# Patient Record
Sex: MALE | Race: WHITE | Employment: FULL TIME | URBAN - METROPOLITAN AREA
[De-identification: names, ages, dates, MRNs, and addresses within clinical notes are randomized per-mention and may not be internally consistent; named-entity substitution may affect disease eponyms.]

---

## 2018-12-14 ENCOUNTER — HOSPITAL ENCOUNTER (EMERGENCY)
Age: 58
Discharge: HOME | End: 2018-12-14
Payer: COMMERCIAL

## 2018-12-14 VITALS
OXYGEN SATURATION: 97 % | BODY MASS INDEX: 23.62 KG/M2 | HEIGHT: 70 IN | HEART RATE: 64 BPM | RESPIRATION RATE: 18 BRPM | DIASTOLIC BLOOD PRESSURE: 82 MMHG | SYSTOLIC BLOOD PRESSURE: 111 MMHG | TEMPERATURE: 97.9 F | WEIGHT: 165 LBS

## 2018-12-14 DIAGNOSIS — S39.012A LOW BACK STRAIN, INITIAL ENCOUNTER: Primary | ICD-10-CM

## 2018-12-14 DIAGNOSIS — Z72.0 TOBACCO ABUSE: ICD-10-CM

## 2018-12-14 PROCEDURE — G8978 MOBILITY CURRENT STATUS: HCPCS

## 2018-12-14 PROCEDURE — G8979 MOBILITY GOAL STATUS: HCPCS

## 2018-12-14 PROCEDURE — 6370000000 HC RX 637 (ALT 250 FOR IP): Performed by: PHYSICIAN ASSISTANT

## 2018-12-14 PROCEDURE — G8980 MOBILITY D/C STATUS: HCPCS

## 2018-12-14 PROCEDURE — 96372 THER/PROPH/DIAG INJ SC/IM: CPT

## 2018-12-14 PROCEDURE — 99282 EMERGENCY DEPT VISIT SF MDM: CPT

## 2018-12-14 PROCEDURE — 97161 PT EVAL LOW COMPLEX 20 MIN: CPT

## 2018-12-14 PROCEDURE — 97110 THERAPEUTIC EXERCISES: CPT

## 2018-12-14 PROCEDURE — 97140 MANUAL THERAPY 1/> REGIONS: CPT

## 2018-12-14 PROCEDURE — 6360000002 HC RX W HCPCS: Performed by: PHYSICIAN ASSISTANT

## 2018-12-14 RX ORDER — LIDOCAINE 4 G/G
1 PATCH TOPICAL DAILY
Status: DISCONTINUED | OUTPATIENT
Start: 2018-12-14 | End: 2018-12-14 | Stop reason: HOSPADM

## 2018-12-14 RX ORDER — NAPROXEN 250 MG/1
250 TABLET ORAL 2 TIMES DAILY WITH MEALS
COMMUNITY
End: 2019-03-24 | Stop reason: ALTCHOICE

## 2018-12-14 RX ORDER — KETOROLAC TROMETHAMINE 30 MG/ML
60 INJECTION, SOLUTION INTRAMUSCULAR; INTRAVENOUS ONCE
Status: COMPLETED | OUTPATIENT
Start: 2018-12-14 | End: 2018-12-14

## 2018-12-14 RX ORDER — METHYLPREDNISOLONE SODIUM SUCCINATE 125 MG/2ML
125 INJECTION, POWDER, LYOPHILIZED, FOR SOLUTION INTRAMUSCULAR; INTRAVENOUS ONCE
Status: COMPLETED | OUTPATIENT
Start: 2018-12-14 | End: 2018-12-14

## 2018-12-14 RX ORDER — LIDOCAINE 50 MG/G
1 PATCH TOPICAL DAILY
Qty: 6 PATCH | Refills: 0 | Status: SHIPPED | OUTPATIENT
Start: 2018-12-14 | End: 2018-12-20

## 2018-12-14 RX ORDER — METHYLPREDNISOLONE 4 MG/1
TABLET ORAL
Qty: 1 KIT | Refills: 0 | Status: SHIPPED | OUTPATIENT
Start: 2018-12-14 | End: 2019-03-24 | Stop reason: ALTCHOICE

## 2018-12-14 RX ADMIN — METHYLPREDNISOLONE SODIUM SUCCINATE 125 MG: 125 INJECTION, POWDER, FOR SOLUTION INTRAMUSCULAR; INTRAVENOUS at 11:33

## 2018-12-14 RX ADMIN — KETOROLAC TROMETHAMINE 60 MG: 60 INJECTION, SOLUTION INTRAMUSCULAR at 11:33

## 2018-12-14 ASSESSMENT — PAIN DESCRIPTION - FREQUENCY: FREQUENCY: CONTINUOUS

## 2018-12-14 ASSESSMENT — PAIN DESCRIPTION - ORIENTATION: ORIENTATION: RIGHT

## 2018-12-14 ASSESSMENT — PAIN DESCRIPTION - PAIN TYPE: TYPE: ACUTE PAIN

## 2018-12-14 ASSESSMENT — PAIN SCALES - GENERAL
PAINLEVEL_OUTOF10: 1
PAINLEVEL_OUTOF10: 5

## 2018-12-14 ASSESSMENT — PAIN DESCRIPTION - ONSET: ONSET: ON-GOING

## 2018-12-14 ASSESSMENT — PAIN DESCRIPTION - LOCATION: LOCATION: BACK;HIP

## 2018-12-14 ASSESSMENT — PAIN DESCRIPTION - DESCRIPTORS: DESCRIPTORS: STABBING;SHARP

## 2018-12-14 ASSESSMENT — PAIN DESCRIPTION - PROGRESSION: CLINICAL_PROGRESSION: GRADUALLY WORSENING

## 2018-12-14 NOTE — PROGRESS NOTES
tenderness with palpation  [x]   Yes   []   No   []   NT  Location:  (+) [facility] R, R lumbar parapspinals  PT notes warmth  []   Yes   [x]   No   []   NT  Location:   PT notes increased muscle tone   []   Yes   [x]   No   []   NT  Location:   Ligament tenderness/provocation:   [x]   Yes   []   No   []   NT  Location:  R SIJ    Gait/Steps/Balance       Deviations on a level linoleum surface include dec oleg, dec stance RLE    [x]  All balance WFL unless otherwise noted below:  Static/ Dynamic Sitting:  Static/Dynamic Standing:    Quick Tests/Functional Myotome Tests:     Heel Walk (L4): [x]   Able to perform WNL       []   Unable to perform        Toe Walk (S1):  [x]   Able to perform WNL     []   Unable to perform    SI Special Tests     SI Special Test Findings   Standing Landmarks [x]  Iliac Crests Equal   [] R High  [] L High  []  PSIS Equal   []  R High  [x]  L High     Standing Flexion Test []   WFL     [x]   Positive R []   Positive L   Seated Landmarks [x]  Iliac Crests Equal   []  R High   [] L High  []  PSIS Equal   []  R High  [x]  L High     Seated Flexion Test []   WFL     [x]   Positive R []   Positive L   Sit up Test/Long sit test []   NEG     [x]   Positive - Comment below:  R short to long   Sacral Sulci Test [x]   [facility]     []   Positive R []   Positive L   SI distraction [x]   NEG      []   Positive R []   Positive L     SI compression [x]   NEG     []   Positive R []   Positive L   Sacral thrust tests [x]   NEG      []   Positive R []   Positive L   Prone knee bend test []   NEG     [x]   Positive     []   NT  Comments:     Lumbar Special Tests     Lumbar Special Test Findings   Straight Leg Raise [x]   NEG    []   Positive R []   Positive L   Crams []   NEG    []   Positive R []   Positive L   Dural Tension/Slump test []   NEG    []   Positive R []   Positive L   Distraction [x]   NEG    []   Inc pain []   Dec pain   Compression [x]   NEG    []   Inc pain []   Dec pain     Lumbar Range of

## 2018-12-14 NOTE — ED PROVIDER NOTES
development consistent with age. HEENT:  NC/NT. Airway patent. Neck:  Normal ROM. Supple. Respiratory:  Clear to auscultation and breath sounds equal.  CV:  Regular rate and rhythm, normal heart sounds, without pathological murmurs, ectopy, gallops, or rubs. GI:  Abdomen Soft, nontender, good bowel sounds. No firm or pulsatile mass. Back: lower lumbar spine right greater than left. Tenderness: None. Swelling: no.              Range of Motion: full range of motion. CVA Tenderness: No.            Straight leg raising:  Bilateral negative. Skin:  no erythema, rash or swelling noted. Distal Function:              Motor deficit: none. Sensory deficit: none. Pulse deficit: none. Calf Tenderness:  No Bilateral.               Edema:  none Both lower extremity(s). Reflexes: Bilateral knee,ankle,biceps normal.  Gait:  normal.  Integument:  Normal turgor. Warm, dry, without visible rash. Lymphatics: No lymphangitis or adenopathy noted. Neurological:  Oriented. Motor functions intact. Lab / Imaging Results   (All laboratory and radiology results have been personally reviewed by myself)  Labs:  No results found for this visit on 12/14/18. Imaging: All Radiology results interpreted by Radiologist unless otherwise noted. No orders to display       ED Course / Medical Decision Making     Medications   ketorolac (TORADOL) injection 60 mg (60 mg Intramuscular Given 12/14/18 1133)   methylPREDNISolone sodium (SOLU-MEDROL) injection 125 mg (125 mg Intramuscular Given 12/14/18 1133)        Medical Decision Making: afebrile, stable, pt. SpO2 on roomair is 97%, not hypoxic, no signs of compression fracture or cauda equina, PT evaluated pt, no indication for imaging, NSAIDs in ED along with steroids, follow up with PT and ortho in 2-3 days.     Films were not obtained based on negative suspicion for bony injury as per

## 2019-03-24 ENCOUNTER — APPOINTMENT (OUTPATIENT)
Dept: GENERAL RADIOLOGY | Age: 59
End: 2019-03-24
Payer: COMMERCIAL

## 2019-03-24 ENCOUNTER — HOSPITAL ENCOUNTER (EMERGENCY)
Age: 59
Discharge: HOME | End: 2019-03-24
Payer: COMMERCIAL

## 2019-03-24 VITALS
OXYGEN SATURATION: 97 % | BODY MASS INDEX: 26.48 KG/M2 | HEIGHT: 70 IN | WEIGHT: 185 LBS | TEMPERATURE: 98.1 F | SYSTOLIC BLOOD PRESSURE: 116 MMHG | RESPIRATION RATE: 16 BRPM | HEART RATE: 87 BPM | DIASTOLIC BLOOD PRESSURE: 80 MMHG

## 2019-03-24 DIAGNOSIS — S46.911A RIGHT SHOULDER STRAIN, INITIAL ENCOUNTER: Primary | ICD-10-CM

## 2019-03-24 DIAGNOSIS — S43.491A OTHER SPRAIN OF RIGHT SHOULDER JOINT, INITIAL ENCOUNTER: ICD-10-CM

## 2019-03-24 PROCEDURE — 99283 EMERGENCY DEPT VISIT LOW MDM: CPT

## 2019-03-24 PROCEDURE — 73030 X-RAY EXAM OF SHOULDER: CPT

## 2019-03-24 ASSESSMENT — ENCOUNTER SYMPTOMS
COLOR CHANGE: 0
SHORTNESS OF BREATH: 0

## 2019-03-24 ASSESSMENT — PAIN DESCRIPTION - ORIENTATION: ORIENTATION: RIGHT

## 2019-03-24 ASSESSMENT — PAIN DESCRIPTION - PAIN TYPE: TYPE: ACUTE PAIN

## 2019-03-24 ASSESSMENT — PAIN DESCRIPTION - LOCATION: LOCATION: SHOULDER

## 2019-03-24 ASSESSMENT — PAIN SCALES - GENERAL: PAINLEVEL_OUTOF10: 2

## 2021-06-07 ENCOUNTER — HOSPITAL ENCOUNTER (EMERGENCY)
Age: 61
Discharge: HOME | End: 2021-06-07
Attending: STUDENT IN AN ORGANIZED HEALTH CARE EDUCATION/TRAINING PROGRAM
Payer: COMMERCIAL

## 2021-06-07 ENCOUNTER — APPOINTMENT (OUTPATIENT)
Dept: GENERAL RADIOLOGY | Age: 61
End: 2021-06-07
Payer: COMMERCIAL

## 2021-06-07 VITALS
TEMPERATURE: 98.9 F | BODY MASS INDEX: 26.66 KG/M2 | WEIGHT: 180 LBS | OXYGEN SATURATION: 97 % | HEART RATE: 78 BPM | HEIGHT: 69 IN | SYSTOLIC BLOOD PRESSURE: 117 MMHG | DIASTOLIC BLOOD PRESSURE: 73 MMHG | RESPIRATION RATE: 16 BRPM

## 2021-06-07 DIAGNOSIS — S83.001A PATELLAR SUBLUXATION, RIGHT, INITIAL ENCOUNTER: Primary | ICD-10-CM

## 2021-06-07 PROCEDURE — 99282 EMERGENCY DEPT VISIT SF MDM: CPT

## 2021-06-07 PROCEDURE — 73560 X-RAY EXAM OF KNEE 1 OR 2: CPT

## 2021-06-07 ASSESSMENT — PAIN SCALES - GENERAL: PAINLEVEL_OUTOF10: 1

## 2021-06-07 NOTE — ED NOTES
Discharge instructions reviewed with Pt. Pt verbalizes understanding at this time. Prescriptions/medications reviewed with pt at this time. Pt condition stable at this time. No concerns voiced.       Chris Clemente RN  06/07/21 0408

## 2021-06-07 NOTE — ED PROVIDER NOTES
[address] [facility],[address]      CHIEF COMPLAINT  Knee Pain (Pt reports today struck his right knee @ approx 225 into a table. Pt reports he felt like his knee cap shifted with the injury. Pt reports able to still walk on it but has continued discomfort. )     HISTORY OF PRESENT ILLNESS  Jn Perez is a 62 y.o. male  who presents to the ED complaining of knee pain. Patient states that he was at work this evening when he struck his right knee into a table. He feels like his kneecap shifted to the side but then returned back to its normal position. He denies any other complaints or concerns. He states that he has been able to ambulate on it with some small amount of pain since that occurred. He denies any numbness or tingling. He denies other complaints or concerns. No other complaints, modifying factors or associated symptoms. I have reviewed the following from the nursing documentation. Past Medical History:   Diagnosis Date    Reflux      Past Surgical History:   Procedure Laterality Date    APPENDECTOMY      HERNIA REPAIR Right 12/16/14    Right Inguinal Hernia Repair    TONSILLECTOMY       History reviewed. No pertinent family history.   Social History     Socioeconomic History    Marital status: Single     Spouse name: Not on file    Number of children: Not on file    Years of education: Not on file    Highest education level: Not on file   Occupational History    Not on file   Tobacco Use    Smoking status: Current Every Day Smoker     Packs/day: 1.00     Years: 40.00     Pack years: 40.00    Smokeless tobacco: Never Used   Vaping Use    Vaping Use: Former   Substance and Sexual Activity    Alcohol use: No    Drug use: No    Sexual activity: Not on file   Other Topics Concern    Not on file   Social History Narrative    Not on file     Social Determinants of Health     Financial Resource Strain:     Difficulty of Paying Living Expenses:    Food Insecurity:     Worried About Running Out of Food in the Last Year:     Wilbert of Food in the Last Year:    Transportation Needs:     Lack of Transportation (Medical):  Lack of Transportation (Non-Medical):    Physical Activity:     Days of Exercise per Week:     Minutes of Exercise per Session:    Stress:     Feeling of Stress :    Social Connections:     Frequency of Communication with Friends and Family:     Frequency of Social Gatherings with Friends and Family:     Attends Yarsanism Services:     Active Member of Clubs or Organizations:     Attends Club or Organization Meetings:     Marital Status:    Intimate Partner Violence:     Fear of Current or Ex-Partner:     Emotionally Abused:     Physically Abused:     Sexually Abused:      No current facility-administered medications for this encounter. Current Outpatient Medications   Medication Sig Dispense Refill    omeprazole (PRILOSEC) 20 MG capsule Take 20 mg by mouth daily. No Known Allergies    REVIEW OF SYSTEMS  10 systems reviewed, pertinent positives per HPI otherwise noted to be negative. PHYSICAL EXAM  /73   Pulse 78   Temp 98.9 °F (37.2 °C) (Oral)   Resp 16   Ht 5' 9\" (1.753 m)   Wt 180 lb (81.6 kg)   SpO2 97%   BMI 26.58 kg/m²    GENERAL APPEARANCE: Awake and alert. Cooperative. No acute distress. HENT: Normocephalic. Atraumatic. NECK: Supple. EYES: PERRL. EOM's grossly intact. HEART/CHEST: RRR. No murmurs. LUNGS: Respirations unlabored. CTAB. Good air exchange. Speaking comfortably in full sentences. ABDOMEN: No tenderness. Soft. Non-distended. No masses. No organomegaly. No guarding or rebound. MUSCULOSKELETAL: No extremity edema. Compartments soft. No deformity. Mild tenderness to palpation over the medial and lateral aspects of the right knee, no joint laxity. All extremities neurovascularly intact. SKIN: Warm and dry. No acute rashes. NEUROLOGICAL: Alert and oriented. CN's 2-12 intact.  No gross facial drooping. Strength 5/5, sensation intact. .  Gait normal.  PSYCHIATRIC: Normal mood and affect. LABS  I have reviewed all labs for this visit. No results found for this visit on 06/07/21. RADIOLOGY  XR KNEE RIGHT (1-2 VIEWS)   Final Result   Unremarkable right knee. ED COURSE/MDM  Patient seen and evaluated. Old records reviewed. Labs and imaging reviewed and results discussed with patient. Patient is a 81-year-old male, presenting with concerns for right knee pain after striking it on a table at work, states that his kneecap shifted to the side but returned to position. Full HPI as detailed above. Upon arrival in the ED, vitals reassuring. Patient is resting comfortably is in no acute distress. Physical exam as detailed above, with mild tenderness to palpation over the medial and lateral aspects of the right knee but no obvious swelling. No joint laxity or obvious joint effusion. Patella is in proper alignment. Right lower extremity is neurovascularly intact with soft compartments. Patient able to ambulate without difficulty. X-ray of the right knee was performed and was negative for any acute findings. Symptoms likely secondary to knee sprain versus patellar subluxation given the patient's history. My concern for vascular compromise given the mechanism and current findings. Patient will be discharged, advised to take over-the-counter Tylenol/ibuprofen as needed for pain control. Also counseled on RICE therapy. Patient will be discharged. Given return precautions to the ED and advised to follow-up with PCP and orthopedics as needed. During the patient's ED course, the patient was given:  Medications - No data to display     CLINICAL IMPRESSION  1. Patellar subluxation, right, initial encounter        Blood pressure 117/73, pulse 78, temperature 98.9 °F (37.2 °C), temperature source Oral, resp.  rate 16, height 5' 9\" (1.753 m), weight 180 lb (81.6 kg), SpO2 97 %. DISPOSITION  Renard Verduzco was discharged to home in good condition. Patient was given scripts for the following medications. I counseled patient how to take these medications. Discharge Medication List as of 6/7/2021  7:01 PM          Follow-up with:  Rafal Noriega  [phone]  Schedule an appointment as soon as possible for a visit   To establish care, as needed    [id number] [facility] Emergency Department  [place] [address] [address]  [phone]  Go to   If symptoms worsen    Martir Varma MD  [address] [place] [address]  [phone]    Schedule an appointment as soon as possible for a visit   As needed, if symptoms do not improve      DISCLAIMER: This chart was created using Dragon dictation software. Efforts were made by me to ensure accuracy, however some errors may be present due to limitations of this technology and occasionally words are not transcribed correctly.        Maria Alejandra Rangel MD  06/08/21 9837

## 2021-12-23 ENCOUNTER — HOSPITAL ENCOUNTER (INPATIENT)
Age: 61
LOS: 1 days | Discharge: HOME | DRG: 246 | End: 2021-12-24
Attending: EMERGENCY MEDICINE | Admitting: INTERNAL MEDICINE
Payer: COMMERCIAL

## 2021-12-23 ENCOUNTER — APPOINTMENT (OUTPATIENT)
Dept: GENERAL RADIOLOGY | Age: 61
DRG: 246 | End: 2021-12-23

## 2021-12-23 DIAGNOSIS — I21.4 NSTEMI (NON-ST ELEVATED MYOCARDIAL INFARCTION) (HCC): Primary | ICD-10-CM

## 2021-12-23 DIAGNOSIS — D64.9 ANEMIA, UNSPECIFIED TYPE: ICD-10-CM

## 2021-12-23 LAB
A/G RATIO: 1.4 (ref 1.1–2.2)
ALBUMIN SERPL-MCNC: 4.2 G/DL (ref 3.4–5)
ALP BLD-CCNC: 58 U/L (ref 40–129)
ALT SERPL-CCNC: 21 U/L (ref 10–40)
ANION GAP SERPL CALCULATED.3IONS-SCNC: 12 MMOL/L (ref 3–16)
ANTI-XA UNFRAC HEPARIN: 0.12 IU/ML (ref 0.3–0.7)
APTT: 20 SEC (ref 26.2–38.6)
AST SERPL-CCNC: 28 U/L (ref 15–37)
BASOPHILS ABSOLUTE: 0 K/UL (ref 0–0.2)
BASOPHILS RELATIVE PERCENT: 0.6 %
BILIRUB SERPL-MCNC: <0.2 MG/DL (ref 0–1)
BUN BLDV-MCNC: 19 MG/DL (ref 7–20)
CALCIUM SERPL-MCNC: 8.9 MG/DL (ref 8.3–10.6)
CHLORIDE BLD-SCNC: 103 MMOL/L (ref 99–110)
CO2: 21 MMOL/L (ref 21–32)
CREAT SERPL-MCNC: 0.9 MG/DL (ref 0.8–1.3)
EKG ATRIAL RATE: 73 BPM
EKG DIAGNOSIS: NORMAL
EKG P AXIS: 70 DEGREES
EKG P-R INTERVAL: 140 MS
EKG Q-T INTERVAL: 432 MS
EKG QRS DURATION: 78 MS
EKG QTC CALCULATION (BAZETT): 475 MS
EKG R AXIS: 36 DEGREES
EKG T AXIS: 34 DEGREES
EKG VENTRICULAR RATE: 73 BPM
EOSINOPHILS ABSOLUTE: 0.2 K/UL (ref 0–0.6)
EOSINOPHILS RELATIVE PERCENT: 2.2 %
GFR AFRICAN AMERICAN: >60
GFR NON-AFRICAN AMERICAN: >60
GLUCOSE BLD-MCNC: 100 MG/DL (ref 70–99)
HCT VFR BLD CALC: 29.8 % (ref 40.5–52.5)
HEMOGLOBIN: 9.5 G/DL (ref 13.5–17.5)
LV EF: 55 %
LV EF: 60 %
LVEF MODALITY: NORMAL
LVEF MODALITY: NORMAL
LYMPHOCYTES ABSOLUTE: 1.9 K/UL (ref 1–5.1)
LYMPHOCYTES RELATIVE PERCENT: 27.3 %
MCH RBC QN AUTO: 24.1 PG (ref 26–34)
MCHC RBC AUTO-ENTMCNC: 31.9 G/DL (ref 31–36)
MCV RBC AUTO: 75.6 FL (ref 80–100)
MONOCYTES ABSOLUTE: 0.6 K/UL (ref 0–1.3)
MONOCYTES RELATIVE PERCENT: 8.3 %
NEUTROPHILS ABSOLUTE: 4.3 K/UL (ref 1.7–7.7)
NEUTROPHILS RELATIVE PERCENT: 61.6 %
PDW BLD-RTO: 17.2 % (ref 12.4–15.4)
PLATELET # BLD: 282 K/UL (ref 135–450)
PMV BLD AUTO: 9.4 FL (ref 5–10.5)
POTASSIUM SERPL-SCNC: 4.3 MMOL/L (ref 3.5–5.1)
RBC # BLD: 3.93 M/UL (ref 4.2–5.9)
SARS-COV-2, NAAT: NOT DETECTED
SODIUM BLD-SCNC: 136 MMOL/L (ref 136–145)
TOTAL PROTEIN: 7.2 G/DL (ref 6.4–8.2)
TROPONIN: 0.06 NG/ML
WBC # BLD: 7 K/UL (ref 4–11)

## 2021-12-23 PROCEDURE — 92933 PRQ TRLML C ATHRC ST ANGIOP1: CPT | Performed by: INTERNAL MEDICINE

## 2021-12-23 PROCEDURE — 99285 EMERGENCY DEPT VISIT HI MDM: CPT

## 2021-12-23 PROCEDURE — 6370000000 HC RX 637 (ALT 250 FOR IP)

## 2021-12-23 PROCEDURE — 99255 IP/OBS CONSLTJ NEW/EST HI 80: CPT | Performed by: INTERNAL MEDICINE

## 2021-12-23 PROCEDURE — 92929 PR PRQ TRLUML CORONARY STENT W/ANGIO ADDL ART/BRNCH: CPT | Performed by: INTERNAL MEDICINE

## 2021-12-23 PROCEDURE — C1724 CATH, TRANS ATHEREC,ROTATION: HCPCS

## 2021-12-23 PROCEDURE — 6360000002 HC RX W HCPCS: Performed by: INTERNAL MEDICINE

## 2021-12-23 PROCEDURE — 85025 COMPLETE CBC W/AUTO DIFF WBC: CPT

## 2021-12-23 PROCEDURE — 2580000003 HC RX 258

## 2021-12-23 PROCEDURE — C1874 STENT, COATED/COV W/DEL SYS: HCPCS

## 2021-12-23 PROCEDURE — 93005 ELECTROCARDIOGRAM TRACING: CPT | Performed by: EMERGENCY MEDICINE

## 2021-12-23 PROCEDURE — 92933 PRQ TRLML C ATHRC ST ANGIOP1: CPT

## 2021-12-23 PROCEDURE — 84484 ASSAY OF TROPONIN QUANT: CPT

## 2021-12-23 PROCEDURE — 85520 HEPARIN ASSAY: CPT

## 2021-12-23 PROCEDURE — 92978 ENDOLUMINL IVUS OCT C 1ST: CPT

## 2021-12-23 PROCEDURE — 94761 N-INVAS EAR/PLS OXIMETRY MLT: CPT

## 2021-12-23 PROCEDURE — 2580000003 HC RX 258: Performed by: INTERNAL MEDICINE

## 2021-12-23 PROCEDURE — C1725 CATH, TRANSLUMIN NON-LASER: HCPCS

## 2021-12-23 PROCEDURE — 99152 MOD SED SAME PHYS/QHP 5/>YRS: CPT | Performed by: INTERNAL MEDICINE

## 2021-12-23 PROCEDURE — 85730 THROMBOPLASTIN TIME PARTIAL: CPT

## 2021-12-23 PROCEDURE — 02F03ZZ FRAGMENTATION IN CORONARY ARTERY, ONE ARTERY, PERCUTANEOUS APPROACH: ICD-10-PCS | Performed by: INTERNAL MEDICINE

## 2021-12-23 PROCEDURE — 4A023N7 MEASUREMENT OF CARDIAC SAMPLING AND PRESSURE, LEFT HEART, PERCUTANEOUS APPROACH: ICD-10-PCS | Performed by: INTERNAL MEDICINE

## 2021-12-23 PROCEDURE — 92978 ENDOLUMINL IVUS OCT C 1ST: CPT | Performed by: INTERNAL MEDICINE

## 2021-12-23 PROCEDURE — 6370000000 HC RX 637 (ALT 250 FOR IP): Performed by: INTERNAL MEDICINE

## 2021-12-23 PROCEDURE — C1761 HC CATH TRANSLUM INTRAVASCULAR LITHOTRIPSY CORONARY: HCPCS

## 2021-12-23 PROCEDURE — 85347 COAGULATION TIME ACTIVATED: CPT

## 2021-12-23 PROCEDURE — 71046 X-RAY EXAM CHEST 2 VIEWS: CPT

## 2021-12-23 PROCEDURE — 6360000002 HC RX W HCPCS

## 2021-12-23 PROCEDURE — C1769 GUIDE WIRE: HCPCS

## 2021-12-23 PROCEDURE — 2060000000 HC ICU INTERMEDIATE R&B

## 2021-12-23 PROCEDURE — 2500000003 HC RX 250 WO HCPCS

## 2021-12-23 PROCEDURE — B240ZZ3 ULTRASONOGRAPHY OF SINGLE CORONARY ARTERY, INTRAVASCULAR: ICD-10-PCS | Performed by: INTERNAL MEDICINE

## 2021-12-23 PROCEDURE — C1894 INTRO/SHEATH, NON-LASER: HCPCS

## 2021-12-23 PROCEDURE — 02C03Z7 EXTIRPATION OF MATTER FROM CORONARY ARTERY, ONE ARTERY, ORBITAL ATHERECTOMY TECHNIQUE, PERCUTANEOUS APPROACH: ICD-10-PCS | Performed by: INTERNAL MEDICINE

## 2021-12-23 PROCEDURE — C1887 CATHETER, GUIDING: HCPCS

## 2021-12-23 PROCEDURE — 93458 L HRT ARTERY/VENTRICLE ANGIO: CPT | Performed by: INTERNAL MEDICINE

## 2021-12-23 PROCEDURE — 87635 SARS-COV-2 COVID-19 AMP PRB: CPT

## 2021-12-23 PROCEDURE — B2111ZZ FLUOROSCOPY OF MULTIPLE CORONARY ARTERIES USING LOW OSMOLAR CONTRAST: ICD-10-PCS | Performed by: INTERNAL MEDICINE

## 2021-12-23 PROCEDURE — 2709999900 HC NON-CHARGEABLE SUPPLY

## 2021-12-23 PROCEDURE — 36415 COLL VENOUS BLD VENIPUNCTURE: CPT

## 2021-12-23 PROCEDURE — 93306 TTE W/DOPPLER COMPLETE: CPT

## 2021-12-23 PROCEDURE — 93005 ELECTROCARDIOGRAM TRACING: CPT | Performed by: INTERNAL MEDICINE

## 2021-12-23 PROCEDURE — C1753 CATH, INTRAVAS ULTRASOUND: HCPCS

## 2021-12-23 PROCEDURE — 6360000004 HC RX CONTRAST MEDICATION

## 2021-12-23 PROCEDURE — 93458 L HRT ARTERY/VENTRICLE ANGIO: CPT

## 2021-12-23 PROCEDURE — 80053 COMPREHEN METABOLIC PANEL: CPT

## 2021-12-23 PROCEDURE — 93010 ELECTROCARDIOGRAM REPORT: CPT | Performed by: INTERNAL MEDICINE

## 2021-12-23 PROCEDURE — 2700000000 HC OXYGEN THERAPY PER DAY

## 2021-12-23 PROCEDURE — B2151ZZ FLUOROSCOPY OF LEFT HEART USING LOW OSMOLAR CONTRAST: ICD-10-PCS | Performed by: INTERNAL MEDICINE

## 2021-12-23 PROCEDURE — 027135Z DILATION OF CORONARY ARTERY, TWO ARTERIES WITH TWO DRUG-ELUTING INTRALUMINAL DEVICES, PERCUTANEOUS APPROACH: ICD-10-PCS | Performed by: INTERNAL MEDICINE

## 2021-12-23 PROCEDURE — 92929 HC PRQ CARD STENT W/ANGIO ADDL: CPT

## 2021-12-23 RX ORDER — SODIUM CHLORIDE 0.9 % (FLUSH) 0.9 %
5-40 SYRINGE (ML) INJECTION PRN
Status: DISCONTINUED | OUTPATIENT
Start: 2021-12-23 | End: 2021-12-24 | Stop reason: HOSPADM

## 2021-12-23 RX ORDER — SODIUM CHLORIDE 0.9 % (FLUSH) 0.9 %
5-40 SYRINGE (ML) INJECTION EVERY 12 HOURS SCHEDULED
Status: DISCONTINUED | OUTPATIENT
Start: 2021-12-23 | End: 2021-12-23 | Stop reason: SDUPTHER

## 2021-12-23 RX ORDER — PANTOPRAZOLE SODIUM 40 MG/1
40 TABLET, DELAYED RELEASE ORAL
Status: DISCONTINUED | OUTPATIENT
Start: 2021-12-24 | End: 2021-12-24 | Stop reason: HOSPADM

## 2021-12-23 RX ORDER — SODIUM CHLORIDE 0.9 % (FLUSH) 0.9 %
5-40 SYRINGE (ML) INJECTION PRN
Status: DISCONTINUED | OUTPATIENT
Start: 2021-12-23 | End: 2021-12-23 | Stop reason: SDUPTHER

## 2021-12-23 RX ORDER — MIDAZOLAM HYDROCHLORIDE 5 MG/ML
INJECTION INTRAMUSCULAR; INTRAVENOUS
Status: COMPLETED | OUTPATIENT
Start: 2021-12-23 | End: 2021-12-23

## 2021-12-23 RX ORDER — FENTANYL CITRATE 50 UG/ML
INJECTION, SOLUTION INTRAMUSCULAR; INTRAVENOUS
Status: COMPLETED | OUTPATIENT
Start: 2021-12-23 | End: 2021-12-23

## 2021-12-23 RX ORDER — POLYETHYLENE GLYCOL 3350 17 G/17G
17 POWDER, FOR SOLUTION ORAL DAILY PRN
Status: DISCONTINUED | OUTPATIENT
Start: 2021-12-23 | End: 2021-12-24 | Stop reason: HOSPADM

## 2021-12-23 RX ORDER — HEPARIN SODIUM 10000 [USP'U]/100ML
1000 INJECTION, SOLUTION INTRAVENOUS CONTINUOUS
Status: DISCONTINUED | OUTPATIENT
Start: 2021-12-23 | End: 2021-12-23 | Stop reason: SDUPTHER

## 2021-12-23 RX ORDER — EPTIFIBATIDE 0.75 MG/ML
2 INJECTION, SOLUTION INTRAVENOUS CONTINUOUS
Status: ACTIVE | OUTPATIENT
Start: 2021-12-23 | End: 2021-12-24

## 2021-12-23 RX ORDER — ONDANSETRON 2 MG/ML
4 INJECTION INTRAMUSCULAR; INTRAVENOUS EVERY 6 HOURS PRN
Status: DISCONTINUED | OUTPATIENT
Start: 2021-12-23 | End: 2021-12-24 | Stop reason: HOSPADM

## 2021-12-23 RX ORDER — HEPARIN SODIUM 1000 [USP'U]/ML
4000 INJECTION, SOLUTION INTRAVENOUS; SUBCUTANEOUS PRN
Status: DISCONTINUED | OUTPATIENT
Start: 2021-12-23 | End: 2021-12-24

## 2021-12-23 RX ORDER — ASPIRIN 81 MG/1
81 TABLET, CHEWABLE ORAL DAILY
Status: DISCONTINUED | OUTPATIENT
Start: 2021-12-24 | End: 2021-12-24 | Stop reason: HOSPADM

## 2021-12-23 RX ORDER — SODIUM CHLORIDE 9 MG/ML
25 INJECTION, SOLUTION INTRAVENOUS PRN
Status: DISCONTINUED | OUTPATIENT
Start: 2021-12-23 | End: 2021-12-24 | Stop reason: HOSPADM

## 2021-12-23 RX ORDER — HEPARIN SODIUM 1000 [USP'U]/ML
2000 INJECTION, SOLUTION INTRAVENOUS; SUBCUTANEOUS ONCE
Status: DISCONTINUED | OUTPATIENT
Start: 2021-12-23 | End: 2021-12-23 | Stop reason: DRUGHIGH

## 2021-12-23 RX ORDER — HEPARIN SODIUM 1000 [USP'U]/ML
4000 INJECTION, SOLUTION INTRAVENOUS; SUBCUTANEOUS ONCE
Status: COMPLETED | OUTPATIENT
Start: 2021-12-23 | End: 2021-12-23

## 2021-12-23 RX ORDER — HEPARIN SODIUM 1000 [USP'U]/ML
INJECTION, SOLUTION INTRAVENOUS; SUBCUTANEOUS
Status: COMPLETED | OUTPATIENT
Start: 2021-12-23 | End: 2021-12-23

## 2021-12-23 RX ORDER — EPTIFIBATIDE 2 MG/ML
INJECTION, SOLUTION INTRAVENOUS
Status: COMPLETED | OUTPATIENT
Start: 2021-12-23 | End: 2021-12-23

## 2021-12-23 RX ORDER — NICOTINE 21 MG/24HR
1 PATCH, TRANSDERMAL 24 HOURS TRANSDERMAL DAILY
Status: DISCONTINUED | OUTPATIENT
Start: 2021-12-23 | End: 2021-12-24 | Stop reason: HOSPADM

## 2021-12-23 RX ORDER — SODIUM CHLORIDE 9 MG/ML
INJECTION, SOLUTION INTRAVENOUS CONTINUOUS
Status: DISCONTINUED | OUTPATIENT
Start: 2021-12-23 | End: 2021-12-24 | Stop reason: HOSPADM

## 2021-12-23 RX ORDER — ACETAMINOPHEN 650 MG/1
650 SUPPOSITORY RECTAL EVERY 6 HOURS PRN
Status: DISCONTINUED | OUTPATIENT
Start: 2021-12-23 | End: 2021-12-24 | Stop reason: HOSPADM

## 2021-12-23 RX ORDER — HEPARIN SODIUM 1000 [USP'U]/ML
2000 INJECTION, SOLUTION INTRAVENOUS; SUBCUTANEOUS PRN
Status: DISCONTINUED | OUTPATIENT
Start: 2021-12-23 | End: 2021-12-24

## 2021-12-23 RX ORDER — HEPARIN SODIUM 10000 [USP'U]/100ML
1170 INJECTION, SOLUTION INTRAVENOUS CONTINUOUS
Status: DISCONTINUED | OUTPATIENT
Start: 2021-12-23 | End: 2021-12-24

## 2021-12-23 RX ORDER — ONDANSETRON 4 MG/1
4 TABLET, ORALLY DISINTEGRATING ORAL EVERY 8 HOURS PRN
Status: DISCONTINUED | OUTPATIENT
Start: 2021-12-23 | End: 2021-12-24 | Stop reason: HOSPADM

## 2021-12-23 RX ORDER — ACETAMINOPHEN 325 MG/1
650 TABLET ORAL EVERY 4 HOURS PRN
Status: DISCONTINUED | OUTPATIENT
Start: 2021-12-23 | End: 2021-12-23 | Stop reason: SDUPTHER

## 2021-12-23 RX ORDER — ACETAMINOPHEN 325 MG/1
650 TABLET ORAL EVERY 6 HOURS PRN
Status: DISCONTINUED | OUTPATIENT
Start: 2021-12-23 | End: 2021-12-24 | Stop reason: HOSPADM

## 2021-12-23 RX ORDER — ATORVASTATIN CALCIUM 80 MG/1
80 TABLET, FILM COATED ORAL NIGHTLY
Status: DISCONTINUED | OUTPATIENT
Start: 2021-12-23 | End: 2021-12-24 | Stop reason: HOSPADM

## 2021-12-23 RX ORDER — DIPHENHYDRAMINE HYDROCHLORIDE 50 MG/ML
INJECTION INTRAMUSCULAR; INTRAVENOUS
Status: COMPLETED | OUTPATIENT
Start: 2021-12-23 | End: 2021-12-23

## 2021-12-23 RX ORDER — SODIUM CHLORIDE 0.9 % (FLUSH) 0.9 %
5-40 SYRINGE (ML) INJECTION EVERY 12 HOURS SCHEDULED
Status: DISCONTINUED | OUTPATIENT
Start: 2021-12-23 | End: 2021-12-24 | Stop reason: HOSPADM

## 2021-12-23 RX ADMIN — MIDAZOLAM HYDROCHLORIDE 1 MG: 5 INJECTION INTRAMUSCULAR; INTRAVENOUS at 16:32

## 2021-12-23 RX ADMIN — HEPARIN SODIUM 4000 UNITS: 1000 INJECTION INTRAVENOUS; SUBCUTANEOUS at 14:18

## 2021-12-23 RX ADMIN — HEPARIN SODIUM 3000 UNITS: 1000 INJECTION, SOLUTION INTRAVENOUS; SUBCUTANEOUS at 16:53

## 2021-12-23 RX ADMIN — ATORVASTATIN CALCIUM 80 MG: 80 TABLET, FILM COATED ORAL at 21:58

## 2021-12-23 RX ADMIN — EPTIFIBATIDE 2 MCG/KG/MIN: 0.75 INJECTION INTRAVENOUS at 16:51

## 2021-12-23 RX ADMIN — EPTIFIBATIDE 2 MCG/KG/MIN: 0.75 INJECTION INTRAVENOUS at 23:11

## 2021-12-23 RX ADMIN — MIDAZOLAM HYDROCHLORIDE 1 MG: 5 INJECTION INTRAMUSCULAR; INTRAVENOUS at 16:29

## 2021-12-23 RX ADMIN — FENTANYL CITRATE 50 MCG: 50 INJECTION INTRAMUSCULAR; INTRAVENOUS at 15:02

## 2021-12-23 RX ADMIN — FENTANYL CITRATE 25 MCG: 50 INJECTION, SOLUTION INTRAMUSCULAR; INTRAVENOUS at 16:29

## 2021-12-23 RX ADMIN — MIDAZOLAM HYDROCHLORIDE 1 MG: 5 INJECTION INTRAMUSCULAR; INTRAVENOUS at 15:41

## 2021-12-23 RX ADMIN — MIDAZOLAM HYDROCHLORIDE 1 MG: 5 INJECTION INTRAMUSCULAR; INTRAVENOUS at 15:10

## 2021-12-23 RX ADMIN — MIDAZOLAM HYDROCHLORIDE 1 MG: 5 INJECTION, SOLUTION INTRAMUSCULAR; INTRAVENOUS at 15:05

## 2021-12-23 RX ADMIN — HEPARIN SODIUM 1000 UNITS/HR: 5000 INJECTION INTRAVENOUS; SUBCUTANEOUS at 14:23

## 2021-12-23 RX ADMIN — MIDAZOLAM HYDROCHLORIDE 1 MG: 5 INJECTION INTRAMUSCULAR; INTRAVENOUS at 15:52

## 2021-12-23 RX ADMIN — FENTANYL CITRATE 25 MCG: 50 INJECTION, SOLUTION INTRAMUSCULAR; INTRAVENOUS at 16:32

## 2021-12-23 RX ADMIN — MIDAZOLAM HYDROCHLORIDE 1 MG: 5 INJECTION, SOLUTION INTRAMUSCULAR; INTRAVENOUS at 15:02

## 2021-12-23 RX ADMIN — MIDAZOLAM HYDROCHLORIDE 1 MG: 5 INJECTION INTRAMUSCULAR; INTRAVENOUS at 16:35

## 2021-12-23 RX ADMIN — EPTIFIBATIDE 14.6 MG: 2 INJECTION, SOLUTION INTRAVENOUS at 15:54

## 2021-12-23 RX ADMIN — MIDAZOLAM HYDROCHLORIDE 1 MG: 5 INJECTION INTRAMUSCULAR; INTRAVENOUS at 16:14

## 2021-12-23 RX ADMIN — FENTANYL CITRATE 25 MCG: 50 INJECTION, SOLUTION INTRAMUSCULAR; INTRAVENOUS at 16:14

## 2021-12-23 RX ADMIN — FENTANYL CITRATE 25 MCG: 50 INJECTION, SOLUTION INTRAMUSCULAR; INTRAVENOUS at 16:59

## 2021-12-23 RX ADMIN — HEPARIN SODIUM 2000 UNITS: 1000 INJECTION, SOLUTION INTRAVENOUS; SUBCUTANEOUS at 15:53

## 2021-12-23 RX ADMIN — DIPHENHYDRAMINE HYDROCHLORIDE 25 MG: 50 INJECTION INTRAMUSCULAR; INTRAVENOUS at 16:59

## 2021-12-23 RX ADMIN — FENTANYL CITRATE 25 MCG: 50 INJECTION, SOLUTION INTRAMUSCULAR; INTRAVENOUS at 15:52

## 2021-12-23 RX ADMIN — MIDAZOLAM HYDROCHLORIDE 1 MG: 5 INJECTION INTRAMUSCULAR; INTRAVENOUS at 15:22

## 2021-12-23 RX ADMIN — MIDAZOLAM HYDROCHLORIDE 1 MG: 5 INJECTION INTRAMUSCULAR; INTRAVENOUS at 15:32

## 2021-12-23 RX ADMIN — MIDAZOLAM HYDROCHLORIDE 1 MG: 5 INJECTION INTRAMUSCULAR; INTRAVENOUS at 16:59

## 2021-12-23 RX ADMIN — EPTIFIBATIDE 612.47 MCG: 2 INJECTION, SOLUTION INTRAVENOUS at 15:11

## 2021-12-23 RX ADMIN — FENTANYL CITRATE 25 MCG: 50 INJECTION, SOLUTION INTRAMUSCULAR; INTRAVENOUS at 16:35

## 2021-12-23 RX ADMIN — FENTANYL CITRATE 25 MCG: 50 INJECTION, SOLUTION INTRAMUSCULAR; INTRAVENOUS at 15:22

## 2021-12-23 RX ADMIN — FENTANYL CITRATE 25 MCG: 50 INJECTION, SOLUTION INTRAMUSCULAR; INTRAVENOUS at 15:10

## 2021-12-23 RX ADMIN — FENTANYL CITRATE 25 MCG: 50 INJECTION, SOLUTION INTRAMUSCULAR; INTRAVENOUS at 15:56

## 2021-12-23 RX ADMIN — FENTANYL CITRATE 25 MCG: 50 INJECTION, SOLUTION INTRAMUSCULAR; INTRAVENOUS at 15:32

## 2021-12-23 NOTE — PROGRESS NOTES
Patient belongings collected by Kiki Parker RN from Rm 201 to Cath lab. Patient being transported to .

## 2021-12-23 NOTE — ED PROVIDER NOTES
EMERGENCY DEPARTMENT ENCOUNTER      This patient was seen and evaluated by the attending physician. Pt Name: Miranda Stearns  MRN: [id number]  Cu[place] 1960  Date of evaluation: 12/23/2021  Provider: ALLEN SenC  PCP: No primary care provider on file. ED Attending: Dr. Axel Monson    History provided by the patient. CHIEF COMPLAINT:     Chief Complaint   Patient presents with    Chest Pain     Intermittent starting 0830 this morning. None on arrival.       HISTORY OF PRESENT ILLNESS:      Miranda Stearns is a 62 y.o. male who presents to [address] [place] [address] ER with complaints of chest pain. Patient states that he had intermittent chest pain started about 830 this morning, states that is actually had a last couple days, states that he gets diaphoretic, pain radiates into his jaw and down his left arm, get short of breath with it as well. No pain currently. Has not had any type of cardiac evaluation, history of smoking, family history and a history of hypertension. Is here for further evaluation. Location:-  Quality:-  Severity:-  Duration:-  Modifying factors:-    Nursing Notes were reviewed     REVIEW OF SYSTEMS:     Review of Systems  All systems, atotal of 10, are reviewed and negative except for those that were just noted in history present illness. PAST MEDICAL HISTORY:     Past Medical History:   Diagnosis Date    Reflux          SURGICAL HISTORY:      Past Surgical History:   Procedure Laterality Date    APPENDECTOMY      HERNIA REPAIR Right 12/16/14    Right Inguinal Hernia Repair    TONSILLECTOMY           CURRENT MEDICATIONS:       Current Discharge Medication List      CONTINUE these medications which have NOT CHANGED    Details   omeprazole (PRILOSEC) 20 MG capsule Take 20 mg by mouth daily. ALLERGIES:    Patient has no known allergies. FAMILY HISTORY:     No family history on file.        SOCIAL HISTORY:       Social History     Socioeconomic History  Marital status: Single     Spouse name: Not on file    Number of children: Not on file    Years of education: Not on file    Highest education level: Not on file   Occupational History    Not on file   Tobacco Use    Smoking status: Current Every Day Smoker     Packs/day: 1.00     Years: 40.00     Pack years: 40.00    Smokeless tobacco: Never Used   Vaping Use    Vaping Use: Former   Substance and Sexual Activity    Alcohol use: No    Drug use: No    Sexual activity: Not on file   Other Topics Concern    Not on file   Social History Narrative    Not on file     Social Determinants of Health     Financial Resource Strain:     Difficulty of Paying Living Expenses: Not on file   Food Insecurity:     Worried About [address] in the Last Year: Not on file    Wilbert of Food in the Last Year: Not on file   Transportation Needs:     Lack of Transportation (Medical): Not on file    Lack of Transportation (Non-Medical):  Not on file   Physical Activity:     Days of Exercise per Week: Not on file    Minutes of Exercise per Session: Not on file   Stress:     Feeling of Stress : Not on file   Social Connections:     Frequency of Communication with Friends and Family: Not on file    Frequency of Social Gatherings with Friends and Family: Not on file    Attends Restorationist Services: Not on file    Active Member of [address] [place] [address] [employer] or Organizations: Not on file    Attends Club or Organization Meetings: Not on file    Marital Status: Not on file   Intimate Partner Violence:     Fear of Current or Ex-Partner: Not on file    Emotionally Abused: Not on file    Physically Abused: Not on file    Sexually Abused: Not on file   Housing Stability:     Unable to Pay for Housing in the Last Year: Not on file    Number of [place] in the Last Year: Not on file    Unstable Housing in the Last Year: Not on file       SCREENINGS:   [place] Coma Scale  Eye Opening: Spontaneous  Best Verbal Response: Throat   Result Value Ref Range    SARS-CoV-2, NAAT Not Detected Not Detected   CBC auto differential   Result Value Ref Range    WBC 7.0 4.0 - 11.0 K/uL    RBC 3.93 (L) 4.20 - 5.90 M/uL    Hemoglobin 9.5 (L) 13.5 - 17.5 g/dL    Hematocrit 29.8 (L) 40.5 - 52.5 %    MCV 75.6 (L) 80.0 - 100.0 fL    MCH 24.1 (L) 26.0 - 34.0 pg    MCHC 31.9 31.0 - 36.0 g/dL    RDW 17.2 (H) 12.4 - 15.4 %    Platelets 268 959 - 603 K/uL    MPV 9.4 5.0 - 10.5 fL    Neutrophils % 61.6 %    Lymphocytes % 27.3 %    Monocytes % 8.3 %    Eosinophils % 2.2 %    Basophils % 0.6 %    Neutrophils Absolute 4.3 1.7 - 7.7 K/uL    Lymphocytes Absolute 1.9 1.0 - 5.1 K/uL    Monocytes Absolute 0.6 0.0 - 1.3 K/uL    Eosinophils Absolute 0.2 0.0 - 0.6 K/uL    Basophils Absolute 0.0 0.0 - 0.2 K/uL   Comprehensive metabolic panel   Result Value Ref Range    Sodium 136 136 - 145 mmol/L    Potassium 4.3 3.5 - 5.1 mmol/L    Chloride 103 99 - 110 mmol/L    CO2 21 21 - 32 mmol/L    Anion Gap 12 3 - 16    Glucose 100 (H) 70 - 99 mg/dL    BUN 19 7 - 20 mg/dL    CREATININE 0.9 0.8 - 1.3 mg/dL    GFR Non-African American >60 >60    GFR African American >60 >60    Calcium 8.9 8.3 - 10.6 mg/dL    Total Protein 7.2 6.4 - 8.2 g/dL    Albumin 4.2 3.4 - 5.0 g/dL    Albumin/Globulin Ratio 1.4 1.1 - 2.2    Total Bilirubin <0.2 0.0 - 1.0 mg/dL    Alkaline Phosphatase 58 40 - 129 U/L    ALT 21 10 - 40 U/L    AST 28 15 - 37 U/L   Troponin   Result Value Ref Range    Troponin 0.06 (H) <0.01 ng/mL   APTT   Result Value Ref Range    aPTT 20.0 (L) 26.2 - 38.6 sec   EKG 12 Lead   Result Value Ref Range    Ventricular Rate 73 BPM    Atrial Rate 73 BPM    P-R Interval 140 ms    QRS Duration 78 ms    Q-T Interval 432 ms    QTc Calculation (Bazett) 475 ms    P Axis 70 degrees    R Axis 36 degrees    T Axis 34 degrees    Diagnosis       Normal sinus rhythmNonspecific ST abnormalityAbnormal ECGConfirmed by Sonja Ervin MD, Belinda Fuentes ([id number]) on 12/23/2021 3:52:37 PM         RADIOLOGY:  All x-ray studies are viewed/reviewedby me. Formal interpretations per the radiologist are as follows:      XR CHEST (2 VW)   Final Result   1. No acute abnormality. EKG:  See EKG interpretation by an attending phsyician      PROCEDURES:       CRITICAL CARE TIME:   Total critical care time today provided was at least 38 minutes. This excludes seperately billable procedure. Critical care time provided for ACS that required close evaluation and/or intervention with concern for patient decompensation. CONSULTS:  IP CONSULT TO CARDIOLOGY      EMERGENCYDEPARTMENT COURSE and DIFFERENTIAL DIAGNOSIS/MDM:   Vitals:    Vitals:    12/23/21 1028 12/23/21 1146 12/23/21 1250 12/23/21 1300   BP: 127/82 107/78 130/87 110/77   Pulse: 74 66 68 68   Resp: 20 18 13 16   Temp: 98.3 °F (36.8 °C)   98.6 °F (37 °C)   TempSrc: Oral   Oral   SpO2: 96% 97% 100% 99%   Weight: 185 lb (83.9 kg)      Height: 5' 9\" (1.753 m)              Patient was evaluated by both myself and Dr. Axel Monson  Patient presented to the emergency room today with complaints of chest pain, patient had chest pain diaphoresis, shortness of breath, he is a multiple risk factors for coronary disease her story is very concerning for an acute coronary syndrome, patient initially wanted to sign out [address], his troponin was elevated at 0.06 and his EKG was not acutely ischemic but I explained to the patient that I felt like he was actively having a heart attack and that he needed to be admitted to the hospital, we spoke with the patient multiple times, he was still adamant about wanting to leave [address] finally apparently did speak to some family and did agree to be admitted, I did start him on a heparin drip. Admit patient to the hospitalist, patient was evaluated by the attending physician who also agrees with this plan.     Patient laboratory studies, radiographic imaging, andassessment were all discussed with the patient and/or patient family. There was shared decision-making between myself, the attending physician, as well as the patient and/or their surrogate and we are all in agreement with admission. There was an opportunity for questions and all questions were answered to the best of my ability and to the satisfaction of the patient and/or patient family. FINAL IMPRESSION:      1. NSTEMI (non-ST elevated myocardial infarction) [facility])          DISPOSITION/PLAN:   DISPOSITIONAdmitted      PATIENT REFERRED TO:  [facility]  ED  [address]  Go to   If symptoms worsen    Tiff Xiao MD  [address] [place] [address].   [facility]  [phone]            DISCHARGE MEDICATIONS:  Current Discharge Medication List                     (Please note that portions of this note were completed with a voice recognition program.  Efforts were made to edit the dictations, but occasionally words are mis-transcribed.)    ALLEN Waters CNP-C (electronically signed)        ALLEN Waters CNP  12/24/21 9877

## 2021-12-23 NOTE — ED TRIAGE NOTES
Left side chest and arm pain intermittently starting at 0830 this morning while at work. Pt has no discomfort on arrival, received 324 Aspirin per EMS. No cardiac hx.

## 2021-12-23 NOTE — PROCEDURES
CARDIAC CATHETERIZATION REPORT     Procedure Date:  2021  Patient Name: Aida Pérez  MRN: [id number] : 1960      INDICATION     Non-STEMI    PROCEDURES PERFORMED     Left heart catheterization  LVgram  Coronary angiogam  Coronary cath  Monitoring of moderate conscious sedation    IVUS of D1  IVUS of LAD    Orbital atherectomy of LAD  Shockwave coronary lithotripsy of LAD    PCI of LAD and D1 with 2 drug-eluting stents using DK crush technique        PROCEDURE DESCRIPTION   Risks/benefits/alternatives/outcomes were discussed with patient and/or family and informed consent was obtained. Using the [facility] scale, the patient's right radial artery was found to be a level B. Patient was prepped draped in the usual sterile fashion. Local anaesthetic was applied over puncture site. Using a back wall technique, a 6 Swazi Terumo sheath was inserted into right radial artery. Verapamil, nitroglycerin, nicardipine were administered through the sheath. Heparin was administered. Diagnostic 5 Italian pigtail, ultra catheters were used for diagnostic angiograms. Attention then turned towards PCI as noted below. At the conclusion of the procedure, a TR band was placed over the puncture site and hemostasis was obtained. There were no immediate complications. I supervised sedation from 3:02 PM to 515 PM with versed 12 mg/fentanyl 300 mcg during the procedure. An independent trained observer pushed meds at my direction. We monitored the patient's level of consciousness and vital signs/physiologic status throughout the procedure duration (see times listed previously). 270 cc contrast was utilized. <20cc EBL. FINDINGS       LVGRAM    LVEDP  15   GRADIENT ACROSS AORTIC VALVE  none   LV FUNCTION EF 60%   WALL MOTION  normal   MITRAL REGURGITATION  mild       CORONARY ARTERIES    LM Less than 10% bdedxzts-goc-prehbs stenosis         LAD Proximal-mid 90% stenosis. Distal 10 to 20% stenosis.   Vessel wraps around the apex    Following balloon dilation, there appeared to be thrombus and 90% stenosis in D1 as well. LCX Proximal 20 to 30% stenosis, mid 70 to 75% stenosis       RCA Dominant, [id number] 40% stenosis         PERCUTANEOUS INTERVENTION DESCRIPTION     Heparin was used for anticoagulation, patient was also given Integrilin and at the end the procedure was given Brilinta oral loading dose. The initial 6 [place] Island[place] sheath was upsized to a 6/7 Terumo slender sheath. Then a 7 [place] XB 3.5 guiding catheter used to intubate the left main. A choice floppy wire was advanced down the LAD to the distal vessel. Attempt was made unsuccessfully to pass a [employer] wire into the first diagonal artery. As such, attention turned towards IVUS and IVUS was performed of the LAD which revealed severe calcification with 90% stenoses. Due to severe calcification, decision was made to perform orbital atherectomy and the initial workhorse wire was exchanged out for a Viper wire and then CSI orbital atherectomy was performed. Additionally shockwave coronary lithotripsy was performed with 3.5 mm balloon in LAD. Following that there was disruption noted in D1 with 90% stenosis. As such, a [employer] cougar wire was advanced into D1. IVUS was performed of D1 which showed a minimal luminal diameter of 2.75 to 3 mm. As such, a 2.75 mm cutting balloon was used to dilate D1. Then kissing balloon inflations were performed with a 2.75 mm noncompliant balloon in D1 and a 3.5 mm balloon into LAD. Following that DK crush technique was utilized to perform stenting of D1 and LAD with Medtronic resolute Swiss 3.0 x 15 mm drug-eluting stent as well as 3.5 x 38 mm drug-eluting stents. Stents were postdilated with 3 mm and 3.75 mm noncompliant balloons. 4 mm noncompliant balloon was used for POT. Follow-up IVUS of these vessels showed good stent apposition/expansion, there is no residual dissection/thrombus.  There was 0% residual stenosis at all lesion sites there was LC 3 flow before and after PCI at all lesion sites.           CONCLUSIONS:     Successful orbital atherectomy of LAD  Successful shockwave coronary lithotripsy of LAD  Successful PCI of LAD/D1 with 2 drug-eluting stents (using DK crush technique)  Consider staged PCI of circumflex pending outpatient clinical follow-up

## 2021-12-23 NOTE — ED NOTES
Gave phone report to AMADO Gage on A2, pending transport. Pt asked to smoke prior to going to floor. Pt was informed he could not smoke on property, I offered to get the pt a Nicotine patch. Pt is refusing at this time.      Patsy Ferro RN  12/23/21 5676

## 2021-12-23 NOTE — ED PROVIDER NOTES
I independently examined and evaluated Lorette Aschoff. All diagnostic, treatment, and disposition decisions were made by myself in conjunction with the [employer] For all further details of the patient's emergency department visit, please see their documentation. 71-year-old male with a history of GERD presents after an episode of chest pain this morning. He states over the past several days he has 3-4 episodes of this per day. Today was the worst episode. He was not exerting himself at the onset of this. It was a pressure across his chest and felt like someone was sitting on his chest.  He had pain radiating up to his jaw bilaterally and pain in his left arm. He states he broke out in a sweat and felt short of breath. He is a smoker and has a significant family history of coronary disease. He has never had any sort of cardiac work-up in the past.        Vitals:    12/23/21 1028   BP: 127/82   Pulse: 74   Resp: 20   Temp: 98.3 °F (36.8 °C)   SpO2: 96%     Here the patient is in no acute distress. Heart is regular rate and rhythm, no respiratory distress.       Results for orders placed or performed during the hospital encounter of 12/23/21   CBC auto differential   Result Value Ref Range    WBC 7.0 4.0 - 11.0 K/uL    RBC 3.93 (L) 4.20 - 5.90 M/uL    Hemoglobin 9.5 (L) 13.5 - 17.5 g/dL    Hematocrit 29.8 (L) 40.5 - 52.5 %    MCV 75.6 (L) 80.0 - 100.0 fL    MCH 24.1 (L) 26.0 - 34.0 pg    MCHC 31.9 31.0 - 36.0 g/dL    RDW 17.2 (H) 12.4 - 15.4 %    Platelets 166 697 - 053 K/uL    MPV 9.4 5.0 - 10.5 fL    Neutrophils % 61.6 %    Lymphocytes % 27.3 %    Monocytes % 8.3 %    Eosinophils % 2.2 %    Basophils % 0.6 %    Neutrophils Absolute 4.3 1.7 - 7.7 K/uL    Lymphocytes Absolute 1.9 1.0 - 5.1 K/uL    Monocytes Absolute 0.6 0.0 - 1.3 K/uL    Eosinophils Absolute 0.2 0.0 - 0.6 K/uL    Basophils Absolute 0.0 0.0 - 0.2 K/uL   Comprehensive metabolic panel   Result Value Ref Range    Sodium 136 136 - 145 mmol/L    Potassium 4.3 3.5 - 5.1 mmol/L    Chloride 103 99 - 110 mmol/L    CO2 21 21 - 32 mmol/L    Anion Gap 12 3 - 16    Glucose 100 (H) 70 - 99 mg/dL    BUN 19 7 - 20 mg/dL    CREATININE 0.9 0.8 - 1.3 mg/dL    GFR Non-African American >60 >60    GFR African American >60 >60    Calcium 8.9 8.3 - 10.6 mg/dL    Total Protein 7.2 6.4 - 8.2 g/dL    Albumin 4.2 3.4 - 5.0 g/dL    Albumin/Globulin Ratio 1.4 1.1 - 2.2    Total Bilirubin <0.2 0.0 - 1.0 mg/dL    Alkaline Phosphatase 58 40 - 129 U/L    ALT 21 10 - 40 U/L    AST 28 15 - 37 U/L   Troponin   Result Value Ref Range    Troponin 0.06 (H) <0.01 ng/mL   EKG 12 Lead   Result Value Ref Range    Ventricular Rate 73 BPM    Atrial Rate 73 BPM    P-R Interval 140 ms    QRS Duration 78 ms    Q-T Interval 432 ms    QTc Calculation (Bazett) 475 ms    P Axis 70 degrees    R Axis 36 degrees    T Axis 34 degrees    Diagnosis       Normal sinus rhythmST abnormality, possible digitalis effectAbnormal ECGNo previous ECGs available       XR CHEST (2 VW)   Final Result   1. No acute abnormality. EKG  The Ekg interpreted by myself  normal sinus rhythm with a rate of 73  Axis is   Normal  QTc is  normal  Intervals and Durations are unremarkable. No specific ST-T wave changes appreciated. No evidence of acute ischemia. No prior EKG for comparison. The patient's troponin is slightly elevated. EKG shows no acute findings.   Given his concerning clinical history and elevated troponin we will start him on a heparin drip and treat for an NSTEMI and admit the patient to the hospitalist.  He was initially considering signing out [address] but after strong encouragement from us and discussion with his family he is in agreement to stay in the hospital.     Dixie Chandler MD  12/27/21 4548

## 2021-12-23 NOTE — PROGRESS NOTES
2 ml of air removed, 11 ml remaining  2 ml of air removed, 9 ml remaining @ 1854  2 ml of air removed, 7 ml remaining at 1904  2 ml of air removed, 5 ml remaining at [facility]

## 2021-12-23 NOTE — ED NOTES
Bed: 20  Expected date:   Expected time:   Means of arrival: CJRAVI  Comments:  [address], RN  12/23/21 0393

## 2021-12-23 NOTE — PROGRESS NOTES
Patient admitted to room  425  from cath lab  Patient oriented to room, call light, bed rails, phone, lights and bathroom. Patient instructed about the schedule of the day including: vital sign frequency, lab draws, possible tests, frequency of MD and staff rounds, including RN/MD rounding together at bedside, daily weights, and I &O's. Patient instructed about prescribed diet, how to use 8MENU, and television. bed alarm in place, patient aware of placement and reason. Telemetry box 22  in place, patient aware of placement and reason. Bed locked, in lowest position, side rails up 2/4, call light within reach. Will continue to monitor. cmu able to visualize patient.

## 2021-12-23 NOTE — PROGRESS NOTES
Provider Last Rate Last Admin    heparin (porcine) injection 4,000 Units  4,000 Units IntraVENous Once Daria Li MD        heparin (porcine) injection 4,000 Units  4,000 Units IntraVENous PRN Daria Li MD        heparin (porcine) injection 2,000 Units  2,000 Units IntraVENous PRN Daria Li MD        heparin 25,000 unit in sodium chloride 0.45% 250 mL (premix) infusion  1,000 Units/hr IntraVENous Continuous Daria Li MD        [START ON 12/24/2021] pantoprazole (PROTONIX) tablet 40 mg  40 mg Oral QAM AC Daria Li MD        sodium chloride flush 0.9 % injection 5-40 mL  5-40 mL IntraVENous 2 times per day Daria Li MD        sodium chloride flush 0.9 % injection 5-40 mL  5-40 mL IntraVENous PRN Daria Li MD        0.9 % sodium chloride infusion  25 mL IntraVENous PRN Daria Li MD        ondansetron (ZOFRAN-ODT) disintegrating tablet 4 mg  4 mg Oral Q8H PRN Daria Li MD        Or    ondansetron [facility]) injection 4 mg  4 mg IntraVENous Q6H PRN Daria Li MD        acetaminophen (TYLENOL) tablet 650 mg  650 mg Oral Q6H PRN Daria Li MD        Or    acetaminophen (TYLENOL) suppository 650 mg  650 mg Rectal Q6H PRN Daria Li MD        polyethylene glycol [facility]) packet 17 g  17 g Oral Daily PRN Daria Li MD        [START ON 12/24/2021] aspirin chewable tablet 81 mg  81 mg Oral Daily Daria Li MD        atorvastatin (LIPITOR) tablet 80 mg  80 mg Oral Nightly Daria Li MD        perflutren lipid microspheres (DEFINITY) injection 1.65 mg  1.5 mL IntraVENous ONCE PRN Daria Li MD        metoprolol tartrate (LOPRESSOR) tablet 12.5 mg  12.5 mg Oral BID Keely Cole MD           Past Medical History:    Past Medical History:   Diagnosis Date    Reflux        Surgical History:    Past Surgical History:   Procedure Laterality Date    APPENDECTOMY      HERNIA REPAIR Right 12/16/14    Right Inguinal Hernia Repair    TONSILLECTOMY Pre-Sedation:  Pre-Sedation Documentation and Exam:  I have personally completed a history, physical exam & review of systems for this patient (see notes). Prior History of Anesthesia Complications:   none    Modified Mallampati:  III (soft palate, base of uvula visible)    ASA Classification:  Class 4 - A patient with an incapacitating systemic disease that is a constant threat to life    Luis Scale: Activity:  2 - Able to move 4 extremities voluntarily on command  Respiration:  2 - Able to breathe deeply and cough freely  Circulation:  2 - BP+/- 20mmHg of normal  Consciousness:  2 - Fully awake  Oxygen Saturation (color):  2 - Able to maintain oxygen saturation >92% on room air    Sedation/Anesthesia Plan:  Guard the patient's safety and welfare. Minimize physical discomfort and pain. Minimize negative psychological responses to treatment by providing sedation and analgesia and maximize the potential amnesia. Patient to meet pre-procedure discharge plan.     Medication Planned:  midazolam intravenously and fentanyl intravenously    Patient is an appropriate candidate for plan of sedation:   yes      Electronically signed by Keely Cole MD on 12/23/2021 at 1:41 PM

## 2021-12-23 NOTE — PROGRESS NOTES
Patient arrived to unit via hospital bed in stable condition with all belongings. VSS. Admission assessment completed as charted. Patient oriented to room, call light, bed, phone, lights, bathroom, and nurse. Patient educated on the daily schedule including vitals, lab draws, assessments, possible tests, schedule of MD rounding, daily weights and I's & O's. Patient instructed on current diet, how to use 8MENU, and TV. Patient verbalizes pain as 0 on 0-10 scale. Patient denies any other needs at this time. Bed is in lowest locked position, call light is within reach, and side rails up 2/4. Will continue to monitor patient for pain, discomfort or any other needs.

## 2021-12-23 NOTE — H&P
Hospital Medicine History & Physical      PCP: No primary care provider on file. Date of Admission: 12/23/2021    Date of Service: Pt seen/examined on 12/23/21 and Admitted to Inpatient with expected LOS greater than two midnights due to medical therapy. Chief Complaint:  Chest pain      History Of Present Illness:    62 y.o. male who presented to [facility] with chest pain. Patient has been having intermittent sternal pain that feels like heavy pressure. Pain radiates to his jaw and left arm. Pain is associated with diaphoresis and SOB. Nothing seems to trigger the episodes. He has been having the symptoms for about three days. He has no known history of CAD. He is an active smoker. He denies fevers, chills, cough, nausea or diarrhea. Past Medical History:          Diagnosis Date    Reflux        Past Surgical History:          Procedure Laterality Date    APPENDECTOMY      HERNIA REPAIR Right 12/16/14    Right Inguinal Hernia Repair    TONSILLECTOMY         Medications Prior to Admission:      Prior to Admission medications    Medication Sig Start Date End Date Taking? Authorizing Provider   omeprazole (PRILOSEC) 20 MG capsule Take 20 mg by mouth daily. Historical Provider, MD       Allergies:  Patient has no known allergies. Social History:      The patient currently lives at home    TOBACCO:   reports that he has been smoking. He has a 40.00 pack-year smoking history. He has never used smokeless tobacco.  ETOH:   reports no history of alcohol use. E-Cigarettes/Vaping Use     Questions Responses    E-Cigarette/Vaping Use Former User    Start Date     Passive Exposure     Quit Date     Counseling Given     Comments             Family History:      Reviewed in detail and negative for DM, CAD, Cancer, CVA. Positive as follows:    No family history on file. REVIEW OF SYSTEMS COMPLETED:   Pertinent positives as noted in the HPI. All other systems reviewed and negative.     PHYSICAL EXAM PERFORMED:    /87   Pulse 68   Temp 98.3 °F (36.8 °C) (Oral)   Resp 13   Ht 5' 9\" (1.753 m)   Wt 185 lb (83.9 kg)   SpO2 100%   BMI 27.32 kg/m²     General appearance:  No apparent distress, appears stated age and cooperative. HEENT:  Normal cephalic, atraumatic without obvious deformity. Pupils equal, round, and reactive to light. Extra ocular muscles intact. Conjunctivae/corneas clear. Neck: Supple, with full range of motion. No jugular venous distention. Trachea midline. Respiratory:  Normal respiratory effort. Clear to auscultation, bilaterally without Rales/Wheezes/Rhonchi. Cardiovascular:  Regular rate and rhythm with normal S1/S2 without murmurs, rubs or gallops. Abdomen: Soft, non-tender, non-distended with normal bowel sounds. Musculoskeletal:  No clubbing, cyanosis or edema bilaterally. Full range of motion without deformity. Skin: Skin color, texture, turgor normal.  No rashes or lesions. Neurologic:  Neurovascularly intact without any focal sensory/motor deficits. Cranial nerves: II-XII intact, grossly non-focal.  Psychiatric:  Alert and oriented, thought content appropriate, normal insight  Capillary Refill: Brisk,3 seconds, normal  Peripheral Pulses: +2 palpable, equal bilaterally       Labs:     Recent Labs     12/23/21  1053   WBC 7.0   HGB 9.5*   HCT 29.8*        Recent Labs     12/23/21  1053      K 4.3      CO2 21   BUN 19   CREATININE 0.9   CALCIUM 8.9     Recent Labs     12/23/21  1053   AST 28   ALT 21   BILITOT <0.2   ALKPHOS 58     No results for input(s): INR in the last 72 hours. Recent Labs     12/23/21  1053   TROPONINI 0.06*       Urinalysis:    No results found for: Brendalyn Fredrick, BACTERIA, RBCUA, BLOODU, [address], GLUCOSEU    Radiology:     CXR: I have reviewed the CXR with the following interpretation: no acute disease  EKG:  I have reviewed the EKG with the following interpretation: NSR    XR CHEST (2 VW)   Final Result   1.   No acute abnormality. ASSESSMENT:    Active Hospital Problems    Diagnosis Date Noted    NSTEMI (non-ST elevated myocardial infarction) ([facility] [place] [address] [I21.4] 12/23/2021         PLAN:  NSTEMI  - continue monitor trops  - EKG without ischemic changes  - cardiology consulted  - started on heparin gtt  - echo ordered  - started on ASA, statin    GERD  - on PPI    Tobacco dependence  - counseling given. Nicotine replacement ordered    DVT Prophylaxis: heparin gtt  Diet: general diet  Code Status: full code    PT/OT Eval Status: not ordered    Dispo - at least two days       Nasir Gimenez MD    Thank you No primary care provider on file. for the opportunity to be involved in this patient's care. If you have any questions or concerns please feel free to contact me at [phone].

## 2021-12-23 NOTE — CONSULTS
[address] [facility]  [phone]      Attending Physician: Zia Knight MD  Reason for Consultation/Chief Complaint: Chest pain    Subjective   History of Present Illness:  Mikel Mcclain is a 62 y.o. patient who presented to the hospital with complaints of chest pain for last 2 to 3 days. Patient had progressive symptoms of chest pressure in the middle of his chest that radiated to his jaw, he noted associated shortness of breath but no nausea or vomiting or sweating. He presented to the emergency room today, troponin levels were found to be elevated 0.06. He denies any prior cardiac history. He says he does see a family doctor but really has only been seen for reflux disease and he does not see them chronically. He states her been no recent changes in his over-the-counter medications, only has been taking medicines for the reflux. He says he is a chronic smoker, denies any prior cardiac evaluation such as stress testing. Past Medical History:   has a past medical history of Reflux. Surgical History:   has a past surgical history that includes Tonsillectomy; Appendectomy; and hernia repair (Right, 12/16/14). Social History:   reports that he has been smoking. He has a 40.00 pack-year smoking history. He has never used smokeless tobacco. He reports that he does not drink alcohol and does not use drugs. Family History:  He states mom has had heart disease but he does not know the details of that. Home Medications:  Were reviewed and are listed in nursing record and/or below  Prior to Admission medications    Medication Sig Start Date End Date Taking? Authorizing Provider   omeprazole (PRILOSEC) 20 MG capsule Take 20 mg by mouth daily.     Historical Provider, MD        CURRENT Medications:  heparin (porcine) injection 4,000 Units, Once  heparin (porcine) injection 4,000 Units, PRN  heparin (porcine) injection 2,000 Units, PRN  heparin 25,000 unit in sodium chloride 0.45% 250 mL (premix) infusion, Continuous  [START ON 12/24/2021] pantoprazole (PROTONIX) tablet 40 mg, QAM AC  sodium chloride flush 0.9 % injection 5-40 mL, 2 times per day  sodium chloride flush 0.9 % injection 5-40 mL, PRN  0.9 % sodium chloride infusion, PRN  ondansetron (ZOFRAN-ODT) disintegrating tablet 4 mg, Q8H PRN   Or  ondansetron (ZOFRAN) injection 4 mg, Q6H PRN  acetaminophen (TYLENOL) tablet 650 mg, Q6H PRN   Or  acetaminophen (TYLENOL) suppository 650 mg, Q6H PRN  polyethylene glycol (GLYCOLAX) packet 17 g, Daily PRN  [START ON 12/24/2021] aspirin chewable tablet 81 mg, Daily  atorvastatin (LIPITOR) tablet 80 mg, Nightly  perflutren lipid microspheres (DEFINITY) injection 1.65 mg, ONCE PRN        Allergies:  Patient has no known allergies. Review of Systems:   A 14 point review of symptoms completed. Pertinent positives identified in the HPI, all other review of symptoms negative as below.       Objective   PHYSICAL EXAM:    Vitals:    12/23/21 1300   BP: 110/77   Pulse: 68   Resp: 16   Temp: 98.6 °F (37 °C)   SpO2: 99%    Weight: 185 lb (83.9 kg)         General Appearance:  Alert, cooperative, no distress, appears stated age   Head:  Normocephalic, without obvious abnormality, atraumatic   Eyes:  PERRL, conjunctiva/corneas clear   Nose: Nares normal, no drainage or sinus tenderness   Throat: Lips, mucosa, and tongue normal   Neck: Supple, symmetrical, trachea midline, no adenopathy, thyroid: not enlarged, symmetric, no tenderness/mass/nodules, no carotid bruit or JVD   Lungs:   Clear to auscultation bilaterally, respirations unlabored   Chest Wall:  No deformity or tenderness   Heart:  Regular rate and rhythm, S1, S2 normal, no murmur, rub or gallop   Abdomen:   Soft, non-tender, bowel sounds active all four quadrants,  no masses, no organomegaly   Extremities: Extremities normal, atraumatic, no cyanosis or edema   Pulses: 2+ and symmetric   Skin: Skin color, texture, turgor normal, no rashes or lesions Pysch: Normal mood and affect   Neurologic: Normal gross motor and sensory exam.         Labs   CBC:   Lab Results   Component Value Date    WBC 7.0 12/23/2021    RBC 3.93 12/23/2021    HGB 9.5 12/23/2021    HCT 29.8 12/23/2021    MCV 75.6 12/23/2021    RDW 17.2 12/23/2021     12/23/2021     CMP:  Lab Results   Component Value Date     12/23/2021    K 4.3 12/23/2021     12/23/2021    CO2 21 12/23/2021    BUN 19 12/23/2021    CREATININE 0.9 12/23/2021    GFRAA >60 12/23/2021    AGRATIO 1.4 12/23/2021    LABGLOM >60 12/23/2021    GLUCOSE 100 12/23/2021    PROT 7.2 12/23/2021    CALCIUM 8.9 12/23/2021    BILITOT <0.2 12/23/2021    ALKPHOS 58 12/23/2021    AST 28 12/23/2021    ALT 21 12/23/2021     PT/INR:  No results found for: PTINR  HgBA1c:No results found for: LABA1C  Lab Results   Component Value Date    TROPONINI 0.06 (H) 12/23/2021         Cardiac Data     Last EKG: Normal sinus rhythm, nonspecific ST/T changes    Echo:    Stress Test:    Cath:    Studies:     cxr       1.  No acute abnormality.             I have reviewed labs and imaging/xray/diagnostic testing in this note. Assessment and Plan          Patient Active Problem List   Diagnosis    NSTEMI (non-ST elevated myocardial infarction) ([facility] [address]       Chest pain, non-STEMI, plan on echocardiogram and heart catheterization today. Risk/benefits/alternatives/outcomes discussed with patient, he understands/agrees. Continue treatment with aspirin, heparin and statin, will add beta-blocker. Tobacco abuse, encouraged smoking cessation    DISCUSSION OF CARDIAC CATHETERIZATION PROCEDURES: The procedures, indications, risks and alternatives have been discussed with the patient and, as appropriate, with the patient's guardian .  Risks discussed included, but are not limited to, bleeding, development of blood clots/emboli, damage to blood vessels, renal failure, malignant cardiac arrhythmias, stroke, heart attack, emergent coronary bypass surgery, death, dye allergy. The patient (and guardian as appropriate) expressed understanding of the aforementioned and wished to proceed. Thank you for allowing us to participate in the care of Cristobal Reynolds. Please call me with any questions [phone].     Xavier Garcia MD, [facility]   Interventional Cardiologist  [facility]  [phone] [facility]  [phone] [address] [place] [address]  12/23/2021 1:38 PM

## 2021-12-23 NOTE — PROGRESS NOTES
Consult placed    Who:Cardiology- Anita Taylor  Date:12/23/2021,  Time:1:18 PM        Electronically signed by Richmond Lara RN on 12/23/2021 at 1:18 PM

## 2021-12-24 VITALS
OXYGEN SATURATION: 96 % | HEIGHT: 69 IN | RESPIRATION RATE: 18 BRPM | SYSTOLIC BLOOD PRESSURE: 112 MMHG | HEART RATE: 71 BPM | DIASTOLIC BLOOD PRESSURE: 66 MMHG | WEIGHT: 185 LBS | TEMPERATURE: 98.6 F | BODY MASS INDEX: 27.4 KG/M2

## 2021-12-24 LAB
ANION GAP SERPL CALCULATED.3IONS-SCNC: 9 MMOL/L (ref 3–16)
BUN BLDV-MCNC: 14 MG/DL (ref 7–20)
CALCIUM SERPL-MCNC: 8.3 MG/DL (ref 8.3–10.6)
CHLORIDE BLD-SCNC: 105 MMOL/L (ref 99–110)
CO2: 22 MMOL/L (ref 21–32)
CREAT SERPL-MCNC: 0.8 MG/DL (ref 0.8–1.3)
EKG ATRIAL RATE: 61 BPM
EKG ATRIAL RATE: 69 BPM
EKG DIAGNOSIS: NORMAL
EKG DIAGNOSIS: NORMAL
EKG P AXIS: 55 DEGREES
EKG P AXIS: 59 DEGREES
EKG P-R INTERVAL: 138 MS
EKG P-R INTERVAL: 142 MS
EKG Q-T INTERVAL: 470 MS
EKG Q-T INTERVAL: 492 MS
EKG QRS DURATION: 82 MS
EKG QRS DURATION: 84 MS
EKG QTC CALCULATION (BAZETT): 495 MS
EKG QTC CALCULATION (BAZETT): 503 MS
EKG R AXIS: 41 DEGREES
EKG R AXIS: 47 DEGREES
EKG T AXIS: 21 DEGREES
EKG T AXIS: 49 DEGREES
EKG VENTRICULAR RATE: 61 BPM
EKG VENTRICULAR RATE: 69 BPM
GFR AFRICAN AMERICAN: >60
GFR NON-AFRICAN AMERICAN: >60
GLUCOSE BLD-MCNC: 96 MG/DL (ref 70–99)
HCT VFR BLD CALC: 27.5 % (ref 40.5–52.5)
HEMOGLOBIN: 8.7 G/DL (ref 13.5–17.5)
MCH RBC QN AUTO: 24.2 PG (ref 26–34)
MCHC RBC AUTO-ENTMCNC: 31.7 G/DL (ref 31–36)
MCV RBC AUTO: 76.4 FL (ref 80–100)
PDW BLD-RTO: 16.9 % (ref 12.4–15.4)
PLATELET # BLD: 253 K/UL (ref 135–450)
PMV BLD AUTO: 9.1 FL (ref 5–10.5)
POTASSIUM REFLEX MAGNESIUM: 4.2 MMOL/L (ref 3.5–5.1)
RBC # BLD: 3.6 M/UL (ref 4.2–5.9)
SODIUM BLD-SCNC: 136 MMOL/L (ref 136–145)
WBC # BLD: 9.4 K/UL (ref 4–11)

## 2021-12-24 PROCEDURE — 80048 BASIC METABOLIC PNL TOTAL CA: CPT

## 2021-12-24 PROCEDURE — 6370000000 HC RX 637 (ALT 250 FOR IP): Performed by: INTERNAL MEDICINE

## 2021-12-24 PROCEDURE — 36415 COLL VENOUS BLD VENIPUNCTURE: CPT

## 2021-12-24 PROCEDURE — 93010 ELECTROCARDIOGRAM REPORT: CPT | Performed by: INTERNAL MEDICINE

## 2021-12-24 PROCEDURE — 85027 COMPLETE CBC AUTOMATED: CPT

## 2021-12-24 PROCEDURE — 99232 SBSQ HOSP IP/OBS MODERATE 35: CPT | Performed by: INTERNAL MEDICINE

## 2021-12-24 PROCEDURE — 93005 ELECTROCARDIOGRAM TRACING: CPT | Performed by: INTERNAL MEDICINE

## 2021-12-24 RX ORDER — ATORVASTATIN CALCIUM 80 MG/1
80 TABLET, FILM COATED ORAL NIGHTLY
Qty: 30 TABLET | Refills: 3 | Status: SHIPPED | OUTPATIENT
Start: 2021-12-24

## 2021-12-24 RX ORDER — ASPIRIN 81 MG/1
81 TABLET, CHEWABLE ORAL DAILY
Qty: 30 TABLET | Refills: 3 | Status: SHIPPED | OUTPATIENT
Start: 2021-12-25

## 2021-12-24 RX ORDER — NICOTINE 21 MG/24HR
1 PATCH, TRANSDERMAL 24 HOURS TRANSDERMAL DAILY
Qty: 30 PATCH | Refills: 3 | Status: SHIPPED | OUTPATIENT
Start: 2021-12-25

## 2021-12-24 RX ADMIN — ASPIRIN 81 MG: 81 TABLET, CHEWABLE ORAL at 10:49

## 2021-12-24 RX ADMIN — TICAGRELOR 90 MG: 90 TABLET ORAL at 05:16

## 2021-12-24 RX ADMIN — METOPROLOL TARTRATE 12.5 MG: 25 TABLET, FILM COATED ORAL at 10:49

## 2021-12-24 RX ADMIN — PANTOPRAZOLE SODIUM 40 MG: 40 TABLET, DELAYED RELEASE ORAL at 05:16

## 2021-12-24 ASSESSMENT — PAIN SCALES - GENERAL: PAINLEVEL_OUTOF10: 0

## 2021-12-24 NOTE — PROGRESS NOTES
Care assumed from previous RN. A&O, VSS, NSR on tele, assessment complete as documented. R band in place to right radial cath site, approximately 5 mL air remains per day RN [place] during bedside handoff. Site with no redness, oozing, or hematoma; radial pulse 3+; pt denies numbness/tingling. Pt aware of cath site precautions and need to remain in bed while R band remains in place.

## 2021-12-24 NOTE — PROGRESS NOTES
Addended by: VIOLET BELL on: 11/5/2019 04:33 PM     Modules accepted: Orders     [facility]   Daily Cardiovascular Progress Note    Admit Date: 12/23/2021    Chief complaint: Chest pain  HPI:     Patient present with chest pain, non-STEMI, underwent heart catheterization yesterday with PCI of LAD/D1, since then, he says he been feeling well, has noted no chest pain, shortness of breath, dizziness, palpitations, lightheadedness or loss of conscious. Denies any bleeding. He has had no issues at the right radial catheterization site.       Medications/Labs all Reviewed:  Patient Active Problem List   Diagnosis    NSTEMI (non-ST elevated myocardial infarction) ([facility] [address]       Medications:  heparin (porcine) injection 4,000 Units, PRN  heparin (porcine) injection 2,000 Units, PRN  pantoprazole (PROTONIX) tablet 40 mg, QAM AC  0.9 % sodium chloride infusion, PRN  ondansetron (ZOFRAN-ODT) disintegrating tablet 4 mg, Q8H PRN   Or  ondansetron (ZOFRAN) injection 4 mg, Q6H PRN  acetaminophen (TYLENOL) tablet 650 mg, Q6H PRN   Or  acetaminophen (TYLENOL) suppository 650 mg, Q6H PRN  polyethylene glycol (GLYCOLAX) packet 17 g, Daily PRN  aspirin chewable tablet 81 mg, Daily  atorvastatin (LIPITOR) tablet 80 mg, Nightly  perflutren lipid microspheres (DEFINITY) injection 1.65 mg, ONCE PRN  metoprolol tartrate (LOPRESSOR) tablet 12.5 mg, BID  0.9 % sodium chloride infusion, Continuous  0.9 % sodium chloride infusion, PRN  nicotine (NICODERM CQ) 14 MG/24HR 1 patch, Daily  heparin 25,000 units in dextrose 5% 250 mL (premix) infusion, Continuous  sodium chloride flush 0.9 % injection 5-40 mL, 2 times per day  sodium chloride flush 0.9 % injection 5-40 mL, PRN  0.9 % sodium chloride infusion, PRN  ticagrelor (BRILINTA) tablet 90 mg, BID           PHYSICAL EXAM   /69   Pulse 71   Temp 97.7 °F (36.5 °C) (Oral)   Resp 16   Ht 5' 9\" (1.753 m)   Wt 185 lb (83.9 kg)   SpO2 96%   BMI 27.32 kg/m²    Vitals:    12/23/21 2201 12/23/21 2314 12/24/21 0518 12/24/21 0849   BP: 102/65 (!) 106/58 100/65 104/69   Pulse: 68 71 67 71   Resp: 18 14 17 16   Temp:   98.4 °F (36.9 °C) 97.7 °F (36.5 °C)   TempSrc:   Oral Oral   SpO2: 99% 96% 98% 96%   Weight:       Height:             Intake/Output Summary (Last 24 hours) at 2021 0932  Last data filed at 2021 0518  Gross per 24 hour   Intake 960 ml   Output 1800 ml   Net -840 ml     Wt Readings from Last 3 Encounters:   21 185 lb (83.9 kg)   21 180 lb (81.6 kg)   19 185 lb (83.9 kg)         Gen: Patient in NAD, resting comfortably  Neck: No JVD or bruits  Respiratory: CTAB no WRR  Chest: normal without deformity  Cardiovascular:RRR, S1S2, no mrg, normal PMI  Abdomen: Soft, NTND, Normal BS  Extremities: No clubbing, cyanosis, or edema, right radial site has a few scant ecchymoses, there is no hematoma/bruit/thrill, no bleeding, there is a normal pulse. Neurological/Psychiatric: AxO x4, No gross motors/sensory deficits  Skin:  Warm and dry      Labs:  CBC:   Recent Labs     21  1053 21  0456   WBC 7.0 9.4   HGB 9.5* 8.7*   HCT 29.8* 27.5*   MCV 75.6* 76.4*    253     BMP:   Recent Labs     21  1053 21  0456    136   K 4.3 4.2    105   CO2 21 22   BUN 19 14   CREATININE 0.9 0.8     MG:  No results for input(s): MG in the last 72 hours. PT/INR: No results for input(s): PROTIME, INR in the last 72 hours.   APTT:   Recent Labs     21  1247   APTT 20.0*     Cardiac Enzymes:   Recent Labs     21  1053   TROPONINI 0.06*       Cardiac Studies:      Cath      CARDIAC CATHETERIZATION REPORT      Procedure Date:  2021  Patient Name: Christina Davalos  MRN: [id number]      : 1960        INDICATION      Non-STEMI     PROCEDURES PERFORMED      Left heart catheterization  LVgram  Coronary angiogam  Coronary cath  Monitoring of moderate conscious sedation     IVUS of D1  IVUS of LAD     Orbital atherectomy of LAD  Shockwave coronary lithotripsy of LAD     PCI of LAD and D1 with 2 drug-eluting stents using DK crush technique           PROCEDURE DESCRIPTION   Risks/benefits/alternatives/outcomes were discussed with patient and/or family and informed consent was obtained. Using the [facility] scale, the patient's right radial artery was found to be a level B. Patient was prepped draped in the usual sterile fashion. Local anaesthetic was applied over puncture site. Using a back wall technique, a 6 Thai Terumo sheath was inserted into right radial artery. Verapamil, nitroglycerin, nicardipine were administered through the sheath. Heparin was administered. Diagnostic 5 Cameroonian pigtail, ultra catheters were used for diagnostic angiograms. Attention then turned towards PCI as noted below. At the conclusion of the procedure, a TR band was placed over the puncture site and hemostasis was obtained. There were no immediate complications. I supervised sedation from 3:02 PM to 515 PM with versed 12 mg/fentanyl 300 mcg during the procedure. An independent trained observer pushed meds at my direction. We monitored the patient's level of consciousness and vital signs/physiologic status throughout the procedure duration (see times listed previously). 270 cc contrast was utilized. <20cc EBL.       FINDINGS         LVGRAM     LVEDP  15   GRADIENT ACROSS AORTIC VALVE  none   LV FUNCTION EF 60%   WALL MOTION  normal   MITRAL REGURGITATION  mild         CORONARY ARTERIES     LM Less than 10% [id number] stenosis            LAD Proximal-mid 90% stenosis. Distal 10 to 20% stenosis.   Vessel wraps around the apex     Following balloon dilation, there appeared to be thrombus and 90% stenosis in D1 as well.         LCX Proximal 20 to 30% stenosis, mid 70 to 75% stenosis         RCA Dominant, [id number] 40% stenosis            PERCUTANEOUS INTERVENTION DESCRIPTION      Heparin was used for anticoagulation, patient was also given Integrilin and at the end the procedure was given Brilinta oral loading dose. The initial 6 Indonesian sheath was upsized to a 6/7 Terumo slender sheath. Then a 7 Western Lisa XB 3.5 guiding catheter used to intubate the left main. A choice floppy wire was advanced down the LAD to the distal vessel. Attempt was made unsuccessfully to pass a LoLowater wire into the first diagonal artery. As such, attention turned towards IVUS and IVUS was performed of the LAD which revealed severe calcification with 90% stenoses. Due to severe calcification, decision was made to perform orbital atherectomy and the initial workhorse wire was exchanged out for a Viper wire and then CSI orbital atherectomy was performed. Additionally shockwave coronary lithotripsy was performed with 3.5 mm balloon in LAD. Following that there was disruption noted in D1 with 90% stenosis.     As such, a [employer] cougar wire was advanced into D1. IVUS was performed of D1 which showed a minimal luminal diameter of 2.75 to 3 mm. As such, a 2.75 mm cutting balloon was used to dilate D1. Then kissing balloon inflations were performed with a 2.75 mm noncompliant balloon in D1 and a 3.5 mm balloon into LAD. Following that DK crush technique was utilized to perform stenting of D1 and LAD with Medtronic resolute Magnolia 3.0 x 15 mm drug-eluting stent as well as 3.5 x 38 mm drug-eluting stents. Stents were postdilated with 3 mm and 3.75 mm noncompliant balloons. 4 mm noncompliant balloon was used for POT. Follow-up IVUS of these vessels showed good stent apposition/expansion, there is no residual dissection/thrombus.  There was 0% residual stenosis at all lesion sites there was LC 3 flow before and after PCI at all lesion sites.              CONCLUSIONS:      Successful orbital atherectomy of LAD  Successful shockwave coronary lithotripsy of LAD  Successful PCI of LAD/D1 with 2 drug-eluting stents (using DK crush technique)  Consider staged PCI of circumflex pending outpatient clinical follow-up                    I have reviewed labs and imaging/xray/diagnostic testing in this note. Assessment and Plan       Patient Active Problem List   Diagnosis    NSTEMI (non-ST elevated myocardial infarction) ([address]     Chest pain, non-STEMI, status post LAD/D1 PCI, continue dual antiplatelet therapy, beta-blocker, statin. Discussed with patient that he will need follow-up in our office and he needs to be adherent to his medications as we may need to consider follow-up ischemia evaluation for circumflex territory as well as possible staged PCI. Have also encouraged him to establish with PCP.     Tobacco abuse, encouraged smoking cessation    Anemia, encourage patient to follow-up with PCP for this, will also order a follow-up CBC for next week. Patient understands that these tests and follow-up will be needed. No further inpatient cardiac work-up or treatment is planned, will sign off, please call with questions. Okay to discharge from cardiac perspective with plans for outpatient follow-up as well as follow-up outpatient labs which were discussed with patient as noted above. Thank you for allowing me to participate in the care of your patient. Please call me with any questions [phone].       Francisco Bond MD, [facility]   Interventional Cardiologist  [facility]  [phone] [facility]  [phone] [address]  12/24/2021 9:32 AM

## 2021-12-24 NOTE — DISCHARGE SUMMARY
Hospital Medicine Discharge Summary    Patient: Miranda Stearns     Gender: male  : 1960   Age: 62 y.o. MRN: [id number]    Admitting Physician: Adelina Figueroa MD  Discharge Physician: Harleen Fletcher MD     Code Status: Full Code     Admit Date: 2021   Discharge Date:   21    Disposition:  Home    Discharge Diagnoses:  NSTEMI s/p -Orbital atherectomy of LAD/ Shockwave coronary lithotripsy of LAD/    PCI of LAD and D1 with 2 PRAVEENA. -DAPT  - EKG without ischemic changes  - cardiology consulted  - started on heparin gtt  - echo ordered  - started on ASA, statin     GERD  - on PPI     Tobacco dependence  - counseling given. Nicotine replacement ordered    Active Hospital Problems    Diagnosis Date Noted    NSTEMI (non-ST elevated myocardial infarction) ([facility] [address] [I21.4] 2021       Follow-up appointments:  two weeks    Outpatient to do list: With cardiology as planned    Condition at Discharge:  Yenny Dorsey Course:   62 y.o. male who presented to [facility] with chest pain. Patient has been having intermittent sternal pain that feels like heavy pressure. Pain radiates to his jaw and left arm. Pain is associated with diaphoresis and SOB. Nothing seems to trigger the episodes. He has been having the symptoms for about three days. He has no known history of CAD. He is an active smoker. He denies fevers, chills, cough, nausea or diarrhea. Cardiology saw him for an NSTEMI for which he had a C and Orbital atherectomy of LAD + Shockwave coronary lithotripsy of LAD + PCI of LAD and D1 with 2 PRAVEENA. He was discharged on dual antiplatelet therapy follow-up with cardiology as an outpatient .     Discharge Medications:   Current Discharge Medication List      START taking these medications    Details   nicotine (NICODERM CQ) 14 MG/24HR Place 1 patch onto the skin daily  Qty: 30 patch, Refills: 3      ticagrelor (BRILINTA) 90 MG TABS tablet Take 1 tablet by mouth 2 times daily  Qty: 60 tablet, Refills: 3      metoprolol tartrate (LOPRESSOR) 25 MG tablet Take 0.5 tablets by mouth 2 times daily  Qty: 60 tablet, Refills: 3      atorvastatin (LIPITOR) 80 MG tablet Take 1 tablet by mouth nightly  Qty: 30 tablet, Refills: 3      aspirin 81 MG chewable tablet Take 1 tablet by mouth daily  Qty: 30 tablet, Refills: 3           Current Discharge Medication List        Current Discharge Medication List      CONTINUE these medications which have NOT CHANGED    Details   omeprazole (PRILOSEC) 20 MG capsule Take 20 mg by mouth daily. Current Discharge Medication List          Discharge ROS:  A complete review of systems was asked and negative except for above    Discharge Exam:    /66   Pulse 71   Temp 98.6 °F (37 °C) (Oral)   Resp 18   Ht 5' 9\" (1.753 m)   Wt 185 lb (83.9 kg)   SpO2 96%   BMI 27.32 kg/m²   General appearance:  NAD  HEENT:   Normal cephalic, atraumatic, moist mucous membranes, no oropharyngeal erythema or exudate  Neck: Supple, trachea midline, no anterior cervical or SC LAD  Heart[de-identified] Normal s1/s2, RRR, no murmurs, gallops, or rubs. no leg edema  Lungs:  CTA bilaterally. Abdomen: Soft, non-tender, non-distended, bowel sounds present, no masses  Musculoskeletal:  No clubbing, no cyanosis, no edema  Skin: No lesion or masses  Neurologic:  Neurovascularly intact without any focal sensory/motor deficits. Cranial nerves: II-XII intact, grossly non-focal.  Psychiatric:  A & O x3  Neuro: Grossly intact, moves all four extremities     Labs:  For convenience and continuity at follow-up the following most recent labs are provided:    Lab Results   Component Value Date    WBC 9.4 12/24/2021    HGB 8.7 12/24/2021    HCT 27.5 12/24/2021    MCV 76.4 12/24/2021     12/24/2021     12/24/2021    K 4.2 12/24/2021     12/24/2021    CO2 22 12/24/2021    BUN 14 12/24/2021    CREATININE 0.8 12/24/2021    CALCIUM 8.3 12/24/2021    ALKPHOS 58 12/23/2021    ALT 21 12/23/2021 AST 28 12/23/2021    BILITOT <0.2 12/23/2021    LABALBU 4.2 12/23/2021     No results found for: INR    Radiology:  Echo Complete    Result Date: 12/23/2021  Transthoracic Echocardiography Report (TTE)  Demographics   Patient Name       Keila Rutledge   Date of Study      12/23/2021         Gender              Male   Patient Number     [id number]         Date of Birth       1960   Visit Number       [id number]          Age                 62 year(s)   Accession Number   [id number]         Room Number         0201   Corporate ID       [id number]           Michelle Dulce,                                                            [facility]   Ordering Physician Tess Mitchell M.D. Physician           Rosy Rodriguez MD, [address]  Procedure Type of Study   TTE procedure:ECHOCARDIOGRAM COMPLETE 2D W DOPPLER W COLOR. Procedure Date Date: 12/23/2021 Start: 01:51 PM Study Location: [address] [place] [address] - Echo Lab Technical Quality: Adequate visualization Indications:Chest pain. Additional Indications:NSTEMI. Patient Status: Routine Height: 69 inches Weight: 185 pounds BSA: 2 m2 BMI: 27.32 kg/m2 BP: 130/87 mmHg  Conclusions   Summary  LV systolic function is normal with EF estimated at 55%. No obvious segmental wall motion abnormalities. There is mild concentric left ventricular hypertrophy. Normal left ventricular diastolic filling pressure. Trivial mitral and pulmonic regurgitation. Mild tricuspid regurgitation. Systolic pulmonary artery pressure (SPAP) is normal estimated at 30mmHg (RAP  of 3mmHg).    Signature   ------------------------------------------------------------------  Electronically signed by Rosy Rodriguez MD, [address]  (Interpreting physician) on 12/23/2021 at 02:26 PM  ------------------------------------------------------------------   Findings   Left Ventricle  LV systolic function is normal with ejection fraction estimated at 55%. No obvious segmental wall motion abnormalities. There is mild concentric left ventricular hypertrophy. Normal left ventricular diastolic filling pressure. Left ventricular cavity size is normal.   Mitral Valve  Mild thickening of the anterior leaflet of mitral valve. Trivial mitral regurgitation. Left Atrium  The left atrium is normal in size. Aortic Valve  Aortic valve leaflets appear mildly thickened. Aorta  The aortic root is normal in size. Right Ventricle  The right ventricle is normal in size and function. TAPSE = 26 mm. S' prime velocity is measured at 14.4 cm/sec. Tricuspid Valve  Tricuspid valve is structurally normal.  Mild tricuspid regurgitation. Systolic pulmonary artery pressure (SPAP) is normal estimated at 30 mmHg  (Right atrial pressure of 3 mmHg). Right Atrium  The right atrial size is normal.  Right atrial area is 12.5 cm2. Pulmonic Valve  The pulmonic valve is structurally normal.  Trivial pulmonic regurgitation. Pericardial Effusion  No pericardial effusion noted. Pleural Effusion  No pleural effusion. Miscellaneous  IVC size is normal (<2.1cm) and collapses > 50% with respiration consistent  with normal RA pressure (3mmHg).   M-Mode/2D Measurements (cm)   LV Diastolic Dimension: 4.6 cm LV Systolic Dimension: 3.1 cm  LV Septum Diastolic: 4.28 cm  LV PW Diastolic: 7.76 cm       AO Root Dimension: 3.1 cm                                 AV Cusp Separation: 2.4 cm                                 LA Dimension: 2.9 cm                                 LA Area: 17.05 cm2                                 LA volume/Index: 44 ml /22 ml/m2  Doppler Measurements   AV Peak Velocity: 102 cm/s     MV Peak E-Wave: 101 cm/s  AV Peak Gradient: 4.16 mmHg    MV Peak A-Wave: 65.6 cm/s                                 MV E/A Ratio: 1.54   TR Velocity:262 cm/s  TR Gradient:27.46 mmHg  Estimated RAP:3 mmHg Estimated RVSP: 30 mmHg  E' Septal Velocity: 12.8 cm/s  E' Lateral Velocity: 11.9 cm/s  E/E' ratio: 8.2  PV Peak Velocity: 67.9 cm/s  PV Peak Gradient: 1.84 mmHg   Aortic Valve   Peak Velocity: 102 cm/s  Peak Gradient: 4.16 mmHg   Cusp Separation: 2.4 cm  Aorta   Aortic Root: 3.1 cm      XR CHEST (2 VW)    Result Date: 12/23/2021  EXAMINATION: TWO XRAY VIEWS OF THE CHEST 12/23/2021 10:50 am COMPARISON: None available. HISTORY: ORDERING SYSTEM PROVIDED HISTORY: cough TECHNOLOGIST PROVIDED HISTORY: Reason for exam:->cough Reason for Exam: chest pain for 3 days FINDINGS: There is biapical scarring. Calcified granulomatous disease is noted. The cardiac silhouette is within normal limits. There is no pneumothorax or pleural effusion. There is a moderate to large hiatal hernia. 1.  No acute abnormality. EKG     Rhythm: normal sinus   Rate: normal  Clinical Impression: no acute changes        The patient was seen and examined on day of discharge and this discharge summary is in conjunction with any daily progress note from day of discharge. Time Spent on discharge is 30 minutes  in the examination, evaluation, counseling and review of medications and discharge plan. Note that more than 30 minutes was spent in preparing discharge papers, discussing discharge with patient, medication review, etc.       Signed:    Josh Black MD   12/24/2021      Thank you No primary care provider on file. for the opportunity to be involved in this patient's care.  If you have any questions or concerns please feel free to contact me at [facility]

## 2021-12-24 NOTE — PROGRESS NOTES
Low dose Heparin:  Anti-Xa drawn at 2216 is 0.12 which is sub therapeutic. Desired range is 0.3-0.7. Give a 2,000 unit IV Bolus dose of heparin and increase the infusion rate to 1,170 units/hr. Recheck the Anti-Xa at 0500 12/24/21. Jefferson Huff [facility] PharmD 12/23/2021 10:42 PM     Per nursing Heparin was not running post cath and will not be restarted. Discontinued labs .   Jefferson Huff [facility] PharmD 12/23/2021 10:54 PM

## 2021-12-27 ENCOUNTER — CARE COORDINATION (OUTPATIENT)
Dept: CASE MANAGEMENT | Age: 61
End: 2021-12-27

## 2021-12-27 LAB — POC ACT LR: 321 SEC

## 2021-12-27 NOTE — CARE COORDINATION
[facility] Transitions Initial Follow Up Call    Call within 2 business days of discharge: Yes    Patient: Jamee Dave Patient : 1960   MRN: <[id number]>  Reason for Admission: NSTEMI  Discharge Date: 21 RARS: Readmission Risk Score: 7.9 ( )      Last Discharge [address]       Complaint Diagnosis Description Type Department Provider    21 Chest Pain NSTEMI (non-ST elevated myocardial infarction) ([facility] [address] . .. ED to Hosp-Admission (Discharged) (ADMITTED) Nghia Cheung MD; [facility]... Spoke with: JADA    Facility: [facility]    Attempted to reach patient via phone for initial post hospital transition call. Unable to leave VM d/t missing or incorrect number. Will attempt outreach at another time.    Nupur Smallwood  [facility]  Care Transitions  [phone]    Care Transitions 24 Hour Call    Care Transitions Interventions         Follow Up  Future Appointments   Date Time Provider Rafal Arreguin   2022 10:15 AM ALLEN Johnson - CNP P CLER CAR ANTONETTE Smallwood LPN

## 2021-12-28 ENCOUNTER — CARE COORDINATION (OUTPATIENT)
Dept: CASE MANAGEMENT | Age: 61
End: 2021-12-28

## 2021-12-28 LAB
POC ACT LR: 261 SEC
POC ACT LR: 280 SEC
POC ACT LR: 333 SEC
POC ACT LR: 342 SEC
POC ACT LR: >400 SEC

## 2021-12-28 NOTE — CARE COORDINATION
[facility] Transitions Initial Follow Up Call    Call within 2 business days of discharge: No    Patient: Ginger Ramírez Patient : 1960   MRN: <[id number]>  Reason for Admission: NSTEMI  Discharge Date: 21 RARS: Readmission Risk Score: 7.9 ( )      Last Discharge [facility]       Complaint Diagnosis Description Type Department Provider    21 Chest Pain NSTEMI (non-ST elevated myocardial infarction) ([facility] [address] . .. ED to Hosp-Admission (Discharged) (ADMITTED) Kenji Moon MD; Aneesh Toth. .. Second attempt to reach pt for follow up call. Number invalid.         Care Transitions 24 Hour Call    Care Transitions Interventions         Follow Up  Future Appointments   Date Time Provider Rafal Arreguin   2022 10:15 AM ALLEN Eng - CNP P CLER CAR ANTONETTE Cole

## 2022-01-10 NOTE — PROGRESS NOTES
[facility]   Cardiology Note              Date:  January 10, 2022  Patientname: Heriberto Daniels  YOB: 1960    Primary Care physician: No primary care provider on file. HISTORY OF PRESENT ILLNESS: Heriberto Daniels is a 62 y.o. male with a history of CAD, tobacco abuse. He presented 12/23/2021 for chest pain and found to have NSTEMI. LHC 12/23/2021 showed significant LAD/Diag treated with CSI/shockwave and PRAVEENA x2. Echo shows EF 55%. Today he presents for hospital follow up for NSTEMI/CAD s/p PCI. He feels great. Denies chest pain, palpitations, edema and dizziness. He has had shortness of breath once. He states brilinta is too expensive, he works as a , does not have insurance. He is very active at work. Past Medical History:   has a past medical history of Reflux. Past Surgical History:   has a past surgical history that includes Tonsillectomy; Appendectomy; and hernia repair (Right, 12/16/14). Home Medications:    Prior to Admission medications    Medication Sig Start Date End Date Taking? Authorizing Provider   nicotine (NICODERM CQ) 14 MG/24HR Place 1 patch onto the skin daily 12/25/21   Chantal Torres MD   [facility]) 90 MG TABS tablet Take 1 tablet by mouth 2 times daily 12/24/21   Chantal Torres MD   metoprolol tartrate (LOPRESSOR) 25 MG tablet Take 0.5 tablets by mouth 2 times daily 12/24/21   Chantal Torres MD   atorvastatin (LIPITOR) 80 MG tablet Take 1 tablet by mouth nightly 12/24/21   Chantal Torres MD   aspirin 81 MG chewable tablet Take 1 tablet by mouth daily 12/25/21   Chantal Torres MD   omeprazole (PRILOSEC) 20 MG capsule Take 20 mg by mouth daily. Historical Provider, MD     Allergies:  Patient has no known allergies. Social History:   reports that he has been smoking. He has a 40.00 pack-year smoking history.  He has never used smokeless tobacco. He reports that he does not drink alcohol and does not use drugs. Family History: family history is not on file. Review of Systems   Review of Systems   Constitutional: Negative. Respiratory: Negative. Cardiovascular: Negative. Gastrointestinal: Negative. Neurological: Negative.       OBJECTIVE:    Vital signs:    /60   Pulse 101   Temp 98.2 °F (36.8 °C)   Ht 5' 9\" (1.753 m)   Wt 201 lb 12.8 oz (91.5 kg)   SpO2 98%   BMI 29.80 kg/m²      Physical Exam:  Constitutional:  Comfortable and alert, NAD, appears stated age  Eyes: PERRL, sclera nonicteric  Neck:  Supple, no masses, no thyroidmegaly, no JVD  Skin:  Warm and dry; no rash or lesions  Heart: Regular, normal apex, S1 and S2 normal, no M/G/R  Lungs:  Normal respiratory effort; clear; no wheezing/rhonchi/rales  Abdomen: soft, non tender, + bowel sounds  Extremities:  No edema or cyanosis; no clubbing  Neuro: alert and oriented, moves legs and arms equally, normal mood and affect  Right radial site soft, no hematoma, 2+ pulse    Data Reviewed:      Coronary angiogram 12/23/2021:  Non-STEMI  PROCEDURES PERFORMED    Left heart catheterization  LVgram  Coronary angiogam  Coronary cath  Monitoring of moderate conscious sedation  IVUS of D1  IVUS of LAD  Orbital atherectomy of LAD  Shockwave coronary lithotripsy of LAD  PCI of LAD and D1 with 2 drug-eluting stents using DK crush technique  PROCEDURE DESCRIPTION   Risks/benefits/alternatives/outcomes were discussed with patient and/or family and informed consent was obtained.  Using the Barbeau scale, the patient's right radial artery was found to be a level B.  Patient was prepped draped in the usual sterile fashion.  Local anaesthetic was applied over puncture site.  Using a back wall technique, a 6 [place] citizen Terumo sheath was inserted into right radial artery.  Verapamil, nitroglycerin, nicardipine were administered through the sheath.  Heparin was administered.  Diagnostic 5 Lithuanian pigtail, ultra catheters were used for diagnostic angiograms.  Attention then turned towards PCI as noted below.  At the conclusion of the procedure, a TR band was placed over the puncture site and hemostasis was obtained. Marsha Finders were no immediate complications. I supervised sedation from 3:02 PM to 515 PM with versed 12 mg/fentanyl 300 mcg during the procedure. An independent trained observer pushed meds at my direction.  We monitored the patient's level of consciousness and vital signs/physiologic status throughout the procedure duration (see times listed previously).  270 cc contrast was utilized. <20cc EBL.   FINDINGS     LVEDP  15   GRADIENT ACROSS AORTIC VALVE  none   LV FUNCTION EF 60%   WALL MOTION  normal   MITRAL REGURGITATION  mild     LM Less than 10% proximalmiddistal stenosis            LAD Proximalmid 90% stenosis.  Distal 10 to 20% stenosis.  Vessel wraps around the apex     Following balloon dilation, there appeared to be thrombus and 90% stenosis in D1 as well.         LCX Proximal 20 to 30% stenosis, mid 70 to 75% stenosis         RCA Dominant, proximalmiddistal 40% stenosis     PERCUTANEOUS INTERVENTION DESCRIPTION    Heparin was used for anticoagulation, patient was also given Integrilin and at the end the procedure was given Brilinta oral loading dose.  The initial 6 Syrian sheath was upsized to a 6/7 Terumo slender sheath.  Then a 7 Western Lisa XB 3.5 guiding catheter used to intubate the left main.  A choice floppy wire was advanced down the LAD to the distal vessel.  Attempt was made unsuccessfully to pass a Nathaly wire into the first diagonal artery.  As such, attention turned towards IVUS and IVUS was performed of the LAD which revealed severe calcification with 90% stenoses.  Due to severe calcification, decision was made to perform orbital atherectomy and the initial workhorse wire was exchanged out for a Viper wire and then CSI orbital atherectomy was performed. Additionally shockwave coronary lithotripsy was performed with 3.5 PANEL:No results found for: HDL, LDLDIRECT, LDLCALC, TRIG  LIVER PROFILE:No results for input(s): AST, ALT, ALB in the last 72 hours. BNP: No results found for: PROBNP    Reviewed all labs and imaging today    Assessment:   CAD: stable, no angina; s/p CSI/shockwave and PRAVEENA x2 LAD/Diag in the setting of NSTEMI 12/23/2021   - 70-75% LCx noted, consider further ischemic evaluation   Anemia  Tobacco abuse: he quit, continued cessation encouraged    Plan:   1. Change brilinta to plavix due to cost  2. Because he feels great and has no angina symptoms, he would like to defer stress testing and possible PCI LCx for now. If symptoms worsen, he will reconsider though he is worried about cost with lack of insurance. 3. Discussed cardiac rehab but he declines  4. CBC  5. Continue aspirin, statin, metoprolol  6. Need lipids/LFTs in follow up  7. Establish with PCP  8.  Follow up in 3 months    Sabas Baez, ALLEN-CNP  [address]  [phone]

## 2022-01-11 ENCOUNTER — OFFICE VISIT (OUTPATIENT)
Dept: CARDIOLOGY CLINIC | Age: 62
End: 2022-01-11

## 2022-01-11 VITALS
WEIGHT: 201.8 LBS | SYSTOLIC BLOOD PRESSURE: 106 MMHG | DIASTOLIC BLOOD PRESSURE: 60 MMHG | HEIGHT: 69 IN | BODY MASS INDEX: 29.89 KG/M2 | TEMPERATURE: 98.2 F | HEART RATE: 101 BPM | OXYGEN SATURATION: 98 %

## 2022-01-11 DIAGNOSIS — I21.4 NON-ST ELEVATION MYOCARDIAL INFARCTION (NSTEMI), SUBSEQUENT EPISODE OF CARE (HCC): Primary | ICD-10-CM

## 2022-01-11 DIAGNOSIS — I25.10 CORONARY ARTERY DISEASE INVOLVING NATIVE CORONARY ARTERY OF NATIVE HEART WITHOUT ANGINA PECTORIS: ICD-10-CM

## 2022-01-11 PROCEDURE — 99214 OFFICE O/P EST MOD 30 MIN: CPT | Performed by: NURSE PRACTITIONER

## 2022-01-11 RX ORDER — CLOPIDOGREL BISULFATE 75 MG/1
75 TABLET ORAL DAILY
Qty: 30 TABLET | Refills: 11 | Status: SHIPPED | OUTPATIENT
Start: 2022-01-11 | End: 2022-01-22 | Stop reason: SDUPTHER

## 2022-01-11 ASSESSMENT — ENCOUNTER SYMPTOMS
GASTROINTESTINAL NEGATIVE: 1
RESPIRATORY NEGATIVE: 1

## 2022-01-11 NOTE — LETTER
[address]  [facility]  [address] [address]  Phone: [phone]  Fax: [phone]    ALLEN Lugo CNP        January 11, 2022     Patient: Cass Coats   YOB: 1960   Date of Visit: 1/11/2022       To Whom It May Concern: It is my medical opinion that Cass Coats may return to full duty immediately with no restrictions. If you have any questions or concerns, please don't hesitate to call.     Sincerely,        ALLEN Lugo CNP

## 2022-01-11 NOTE — PATIENT INSTRUCTIONS
Reba Coppola, for first dose of plavix take 4 pills (300 mg) then 1 pill daily thereafter (75 mg)  Continue other medications  Call the office if symptoms worsen  Stay active along with a healthy diet  Follow up in 3 months

## 2022-01-17 ENCOUNTER — APPOINTMENT (OUTPATIENT)
Dept: GENERAL RADIOLOGY | Age: 62
End: 2022-01-17

## 2022-01-17 ENCOUNTER — TELEPHONE (OUTPATIENT)
Dept: CARDIOLOGY | Age: 62
End: 2022-01-17

## 2022-01-17 ENCOUNTER — HOSPITAL ENCOUNTER (EMERGENCY)
Age: 62
Discharge: HOME | End: 2022-01-17

## 2022-01-17 VITALS
RESPIRATION RATE: 15 BRPM | HEIGHT: 69 IN | WEIGHT: 202 LBS | TEMPERATURE: 98.6 F | OXYGEN SATURATION: 98 % | BODY MASS INDEX: 29.92 KG/M2 | HEART RATE: 71 BPM | DIASTOLIC BLOOD PRESSURE: 69 MMHG | SYSTOLIC BLOOD PRESSURE: 105 MMHG

## 2022-01-17 DIAGNOSIS — R42 DIZZINESS: Primary | ICD-10-CM

## 2022-01-17 DIAGNOSIS — D64.9 ANEMIA, UNSPECIFIED TYPE: ICD-10-CM

## 2022-01-17 DIAGNOSIS — R61 DIAPHORESIS: ICD-10-CM

## 2022-01-17 DIAGNOSIS — R89.9 ABNORMAL LABORATORY TEST: Primary | ICD-10-CM

## 2022-01-17 DIAGNOSIS — R06.02 SHORTNESS OF BREATH: ICD-10-CM

## 2022-01-17 LAB
ANION GAP SERPL CALCULATED.3IONS-SCNC: 13 MMOL/L (ref 3–16)
BACTERIA: ABNORMAL /HPF
BASOPHILS ABSOLUTE: 0.1 K/UL (ref 0–0.2)
BASOPHILS RELATIVE PERCENT: 0.8 %
BILIRUBIN URINE: NEGATIVE
BLOOD, URINE: NEGATIVE
BUN BLDV-MCNC: 22 MG/DL (ref 7–20)
CALCIUM SERPL-MCNC: 8.7 MG/DL (ref 8.3–10.6)
CHLORIDE BLD-SCNC: 106 MMOL/L (ref 99–110)
CLARITY: CLEAR
CO2: 20 MMOL/L (ref 21–32)
COLOR: YELLOW
CREAT SERPL-MCNC: 1.1 MG/DL (ref 0.8–1.3)
EKG ATRIAL RATE: 71 BPM
EKG DIAGNOSIS: NORMAL
EKG P AXIS: 43 DEGREES
EKG P-R INTERVAL: 122 MS
EKG Q-T INTERVAL: 426 MS
EKG QRS DURATION: 84 MS
EKG QTC CALCULATION (BAZETT): 462 MS
EKG R AXIS: 24 DEGREES
EKG T AXIS: 39 DEGREES
EKG VENTRICULAR RATE: 71 BPM
EOSINOPHILS ABSOLUTE: 0.1 K/UL (ref 0–0.6)
EOSINOPHILS RELATIVE PERCENT: 1.2 %
EPITHELIAL CELLS, UA: ABNORMAL /HPF (ref 0–5)
GFR AFRICAN AMERICAN: >60
GFR NON-AFRICAN AMERICAN: >60
GLUCOSE BLD-MCNC: 112 MG/DL (ref 70–99)
GLUCOSE URINE: NEGATIVE MG/DL
HCT VFR BLD CALC: 24.9 % (ref 40.5–52.5)
HEMOGLOBIN: 7.7 G/DL (ref 13.5–17.5)
HYALINE CASTS: ABNORMAL /LPF (ref 0–2)
KETONES, URINE: NEGATIVE MG/DL
LEUKOCYTE ESTERASE, URINE: NEGATIVE
LYMPHOCYTES ABSOLUTE: 1.5 K/UL (ref 1–5.1)
LYMPHOCYTES RELATIVE PERCENT: 15.5 %
MCH RBC QN AUTO: 22.7 PG (ref 26–34)
MCHC RBC AUTO-ENTMCNC: 31 G/DL (ref 31–36)
MCV RBC AUTO: 73 FL (ref 80–100)
MICROSCOPIC EXAMINATION: YES
MONOCYTES ABSOLUTE: 0.6 K/UL (ref 0–1.3)
MONOCYTES RELATIVE PERCENT: 5.9 %
MUCUS: ABNORMAL /LPF
NEUTROPHILS ABSOLUTE: 7.2 K/UL (ref 1.7–7.7)
NEUTROPHILS RELATIVE PERCENT: 76.6 %
NITRITE, URINE: NEGATIVE
OCCULT BLOOD SCREENING: NORMAL
PDW BLD-RTO: 17.4 % (ref 12.4–15.4)
PH UA: 5.5 (ref 5–8)
PLATELET # BLD: 350 K/UL (ref 135–450)
PMV BLD AUTO: 8.3 FL (ref 5–10.5)
POTASSIUM SERPL-SCNC: 4.2 MMOL/L (ref 3.5–5.1)
PROTEIN UA: ABNORMAL MG/DL
RBC # BLD: 3.42 M/UL (ref 4.2–5.9)
RBC UA: ABNORMAL /HPF (ref 0–4)
SODIUM BLD-SCNC: 139 MMOL/L (ref 136–145)
SPECIFIC GRAVITY UA: 1.02 (ref 1–1.03)
TROPONIN: <0.01 NG/ML
URINE TYPE: ABNORMAL
UROBILINOGEN, URINE: 0.2 E.U./DL
WBC # BLD: 9.4 K/UL (ref 4–11)
WBC UA: ABNORMAL /HPF (ref 0–5)

## 2022-01-17 PROCEDURE — 93010 ELECTROCARDIOGRAM REPORT: CPT | Performed by: INTERNAL MEDICINE

## 2022-01-17 PROCEDURE — 84484 ASSAY OF TROPONIN QUANT: CPT

## 2022-01-17 PROCEDURE — 93005 ELECTROCARDIOGRAM TRACING: CPT | Performed by: EMERGENCY MEDICINE

## 2022-01-17 PROCEDURE — 85025 COMPLETE CBC W/AUTO DIFF WBC: CPT

## 2022-01-17 PROCEDURE — 80048 BASIC METABOLIC PNL TOTAL CA: CPT

## 2022-01-17 PROCEDURE — 87086 URINE CULTURE/COLONY COUNT: CPT

## 2022-01-17 PROCEDURE — 71046 X-RAY EXAM CHEST 2 VIEWS: CPT

## 2022-01-17 PROCEDURE — 82270 OCCULT BLOOD FECES: CPT

## 2022-01-17 PROCEDURE — 81001 URINALYSIS AUTO W/SCOPE: CPT

## 2022-01-17 PROCEDURE — 2580000003 HC RX 258: Performed by: NURSE PRACTITIONER

## 2022-01-17 PROCEDURE — 99285 EMERGENCY DEPT VISIT HI MDM: CPT

## 2022-01-17 RX ORDER — PANTOPRAZOLE SODIUM 20 MG/1
40 TABLET, DELAYED RELEASE ORAL DAILY
Qty: 30 TABLET | Refills: 0 | Status: SHIPPED | OUTPATIENT
Start: 2022-01-17

## 2022-01-17 RX ORDER — 0.9 % SODIUM CHLORIDE 0.9 %
1000 INTRAVENOUS SOLUTION INTRAVENOUS ONCE
Status: COMPLETED | OUTPATIENT
Start: 2022-01-17 | End: 2022-01-17

## 2022-01-17 RX ORDER — CHOLECALCIFEROL (VITAMIN D3) 10(400)/ML
160 DROPS ORAL 2 TIMES DAILY
Qty: 30 TABLET | Refills: 0 | Status: SHIPPED | OUTPATIENT
Start: 2022-01-17

## 2022-01-17 RX ADMIN — SODIUM CHLORIDE 1000 ML: 9 INJECTION, SOLUTION INTRAVENOUS at 10:40

## 2022-01-17 NOTE — ED PROVIDER NOTES
[facility]  ED    CHIEF COMPLAINT  Dizziness (was at work lifting things when he became diaphoretic and dizzy, had 2 cardiac stents placed on dec. 23. denies chest pain)    HISTORY OF PRESENT ILLNESS  Radha Mcgowan is a 62 y.o. male who presents to the ED complaining of dizziness, sweating, SOB. Back on the 23rd he came in and had a MI, had 2 stents placed. 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 He started getting hot, sweating and then dizzy. If he tried to stand up for a long period of time he would get dizzy again. He was having SOB this morning with the above. The plates were about 77-44 # each, moved one at a time. He was physically carrying the plates. Denies CP during this episode. This was at 8:20 am. Natalia Moreno in the bed currently does not feel dizzy. He describes the dizziness as light headed, he can not quantify. Denies room spinning sensation, kind of like he might pass out, he had to go sit down. When it first came on he felt unsteady, after a while he was able to get his bearings and walk pretty well straight and go sit down. Denies nausea, vomiting. He is having SOB since the MI, was told the plavix could cause it, he saw Rg Dhaliwal, CYRUS with cardiology and was told they could change it to something else. Wanted him to continue taking those until he runs out. After having trhe stents noticed he is SOB. The patient is currently rating their pain as 0/10. Treatments tried prior to arrival in the ED: none. The patient denies other complaints, modifying factors or associated symptoms. The patient arrived to the ED via EMS transport. Nursing notes reviewed. Past Medical History:   Diagnosis Date    Reflux      Past Surgical History:   Procedure Laterality Date    APPENDECTOMY      CARDIAC SURGERY      HERNIA REPAIR Right 12/16/14    Right Inguinal Hernia Repair    TONSILLECTOMY       History reviewed.  No pertinent family history. Social History     Socioeconomic History    Marital status: Single     Spouse name: Not on file    Number of children: Not on file    Years of education: Not on file    Highest education level: Not on file   Occupational History    Not on file   Tobacco Use    Smoking status: Current Every Day Smoker     Packs/day: 1.00     Years: 40.00     Pack years: 40.00    Smokeless tobacco: Never Used   Vaping Use    Vaping Use: Former   Substance and Sexual Activity    Alcohol use: No    Drug use: No    Sexual activity: Not on file   Other Topics Concern    Not on file   Social History Narrative    Not on file     Social Determinants of Health     Financial Resource Strain:     Difficulty of Paying Living Expenses: Not on file   Food Insecurity:     Worried About [address] in the Last Year: Not on file    Wilbert of Food in the Last Year: Not on file   Transportation Needs:     Lack of Transportation (Medical): Not on file    Lack of Transportation (Non-Medical):  Not on file   Physical Activity:     Days of Exercise per Week: Not on file    Minutes of Exercise per Session: Not on file   Stress:     Feeling of Stress : Not on file   Social Connections:     Frequency of Communication with Friends and Family: Not on file    Frequency of Social Gatherings with Friends and Family: Not on file    Attends Baptism Services: Not on file    Active Member of [address] [place] [address] or Organizations: Not on file    Attends Club or Organization Meetings: Not on file    Marital Status: Not on file   Intimate Partner Violence:     Fear of Current or Ex-Partner: Not on file    Emotionally Abused: Not on file    Physically Abused: Not on file    Sexually Abused: Not on file   Housing Stability:     Unable to Pay for Housing in the Last Year: Not on file    Number of [place] in the Last Year: Not on file    Unstable Housing in the Last Year: Not on file     No current facility-administered medications for this encounter. Current Outpatient Medications   Medication Sig Dispense Refill    clopidogrel (PLAVIX) 75 MG tablet Take 1 tablet by mouth daily 30 tablet 11    nicotine (NICODERM CQ) 14 MG/24HR Place 1 patch onto the skin daily 30 patch 3    metoprolol tartrate (LOPRESSOR) 25 MG tablet Take 0.5 tablets by mouth 2 times daily 60 tablet 3    atorvastatin (LIPITOR) 80 MG tablet Take 1 tablet by mouth nightly 30 tablet 3    aspirin 81 MG chewable tablet Take 1 tablet by mouth daily 30 tablet 3    omeprazole (PRILOSEC) 20 MG capsule Take 20 mg by mouth daily. No Known Allergies    REVIEW OF SYSTEMS  10 systems reviewed, pertinent positives per HPI otherwise noted to be negative    PHYSICAL EXAM  BP 95/69   Pulse 72   Temp 98.6 °F (37 °C) (Oral)   Resp 25   Ht 5' 9\" (1.753 m)   Wt 202 lb (91.6 kg)   SpO2 99%   BMI 29.83 kg/m²      GENERAL APPEARANCE: Well developed, well nourished. Awake and alert. Cooperative. Observed resting in bed. No acute distress. HEAD: Normocephalic. Atraumatic. No facial asymmetry upon exam. Sensation is intact to light touch to the face. Smile is symmetrical, tongue is midline. Uvula is midline and elevates appropriately. Posterior oropharynx is without erythema, edema, or exudate. EYES: Sclera is non-icteric. Conjunctiva normal. EOMI, PERRL. No nystagmus. ENT: External ears are normal. Mucous membranes are moist.   NECK: Supple. Normal ROM. Trachea mid-line. Cardiac: Regular rate and rhythm. Capillary refill is brisk in bilateral upper extremities. S1/S2 is normal. There are no murmurs, rubs, or gallops to auscultation. LUNGS: Breathing is unlabored. Speaking comfortably in full sentences. Equal and symmetric chest rise. Breath sounds are clear throughout. There is no wheezing, rales, or rhonchi to auscultation. Abdomen: Non-distended. Non-tender to palpation. Bowel sounds are positive in all 4 quadrants. There is no hepatosplenomegaly to palpation.  Rectal exam: negative without mass, lesions or tenderness, sphincter tone normal, JOHNNY with brown stool, no bloody, black or tarry appearance, stool guaiac negative, exam chaperoned by ED RN. Musculoskeletal: Normal ROM. No gross deformities or trauma noted. Moving all extremities equally and appropriately. NEUROLOGICAL: Alert and oriented x3. Strength is normal. No focal motor or sensory deficits. Gait observed and normal. Strength is 5/5, equal and symmetric. Romberg is negative. No pronator drift. Finger to nose is intact bilaterally. Heel to shin is intact bilaterally. SKIN: Warm and dry. Skin is with good color. Skin is intact. Psychiatric: Mood and affect appropriate. Speech is clear and articulate. Orthostatic BP  Blood Pressure Lyin/63 93/58 --    Pulse Lyin PER MINUTE 65 PER MINUTE --    Blood Pressure Sittin/67 95/62 --    Pulse Sittin PER MINUTE 74 PER MINUTE --    Blood Pressure Standin/61 97/60 --    Pulse Standin PER MINUTE 79 PER MINUTE         PROCEDURES  None    RADIOLOGY  XR CHEST (2 VW)    Result Date: 2022  EXAMINATION: TWO XRAY VIEWS OF THE CHEST 2022 9:25 am COMPARISON: 2020 HISTORY: ORDERING SYSTEM PROVIDED HISTORY: syncope TECHNOLOGIST PROVIDED HISTORY: Reason for exam:->syncope Reason for Exam: dizziness FINDINGS: The lungs are without acute focal process. There is no effusion or pneumothorax. The cardiomediastinal silhouette is without acute process. The osseous structures are without acute process. Redemonstration of large hiatal hernia with air-fluid level. No acute process. Redemonstration of a large hiatal hernia with air-fluid level.        LABS  Results for orders placed or performed during the hospital encounter of    Basic Metabolic Panel   Result Value Ref Range    Sodium 139 136 - 145 mmol/L    Potassium 4.2 3.5 - 5.1 mmol/L    Chloride 106 99 - 110 mmol/L    CO2 20 (L) 21 - 32 mmol/L    Anion Gap 13 3 - 16 Glucose 112 (H) 70 - 99 mg/dL    BUN 22 (H) 7 - 20 mg/dL    CREATININE 1.1 0.8 - 1.3 mg/dL    GFR Non-African American >60 >60    GFR African American >60 >60    Calcium 8.7 8.3 - 10.6 mg/dL   Troponin   Result Value Ref Range    Troponin <0.01 <0.01 ng/mL   CBC Auto Differential   Result Value Ref Range    WBC 9.4 4.0 - 11.0 K/uL    RBC 3.42 (L) 4.20 - 5.90 M/uL    Hemoglobin 7.7 (L) 13.5 - 17.5 g/dL    Hematocrit 24.9 (L) 40.5 - 52.5 %    MCV 73.0 (L) 80.0 - 100.0 fL    MCH 22.7 (L) 26.0 - 34.0 pg    MCHC 31.0 31.0 - 36.0 g/dL    RDW 17.4 (H) 12.4 - 15.4 %    Platelets 401 807 - 483 K/uL    MPV 8.3 5.0 - 10.5 fL    Neutrophils % 76.6 %    Lymphocytes % 15.5 %    Monocytes % 5.9 %    Eosinophils % 1.2 %    Basophils % 0.8 %    Neutrophils Absolute 7.2 1.7 - 7.7 K/uL    Lymphocytes Absolute 1.5 1.0 - 5.1 K/uL    Monocytes Absolute 0.6 0.0 - 1.3 K/uL    Eosinophils Absolute 0.1 0.0 - 0.6 K/uL    Basophils Absolute 0.1 0.0 - 0.2 K/uL   Urinalysis, reflex to microscopic   Result Value Ref Range    Color, UA Yellow Straw/Yellow    Clarity, UA Clear Clear    Glucose, Ur Negative Negative mg/dL    Bilirubin Urine Negative Negative    Ketones, Urine Negative Negative mg/dL    Specific Gravity, UA 1.025 1.005 - 1.030    Blood, Urine Negative Negative    pH, UA 5.5 5.0 - 8.0    Protein, UA TRACE (A) Negative mg/dL    Urobilinogen, Urine 0.2 <2.0 E.U./dL    Nitrite, Urine Negative Negative    Leukocyte Esterase, Urine Negative Negative    Microscopic Examination YES     Urine Type NotGiven    Blood Occult Stool Screen #1   Result Value Ref Range    Occult Blood Screening Result: Negative  Normal range: Negative      Microscopic Urinalysis   Result Value Ref Range    Hyaline Casts, UA 3-5 (A) 0 - 2 /LPF    Mucus, UA 3+ (A) None Seen /LPF    WBC, UA 10-20 (A) 0 - 5 /HPF    RBC, UA 3-4 0 - 4 /HPF    Epithelial Cells, UA 2-5 0 - 5 /HPF    Bacteria, UA 2+ (A) None Seen /HPF   EKG 12 Lead   Result Value Ref Range    Ventricular Rate 71 BPM    Atrial Rate 71 BPM    P-R Interval 122 ms    QRS Duration 84 ms    Q-T Interval 426 ms    QTc Calculation (Bazett) 462 ms    P Axis 43 degrees    R Axis 24 degrees    T Axis 39 degrees    Diagnosis       Normal sinus rhythmNormal ECGWhen compared with ECG of 24-DEC-2021 05:14,No significant change was found     ED COURSE/MDM  Patient seen and evaluated. Old records reviewed. Diagnostic testing reviewed and results discussed. I have evaluated this patient. My supervising physician was available for consultation. Sarita Herring presented to the ED today with above noted complaints. Arrival vital signs: Febrile, heart rate not tachycardic, BP low as it is running 90s over 50s to 60s. Well saturated on room air. Physical exam performed at 0950: No adventitious breath sounds on exam.  No reproducible abdominal tenderness to palpation. Patient is neurologically intact, cranial nerves II through XII grossly intact. He is without focal or lateralizing deficits on exam.  NIH is 0. Blood work: No electrolyte abnormality. No evidence of acute kidney injury. Troponin is negative. No evidence of systemic infection. H&H is low at 7.7/24.9. Hemoglobin has dropped one-point from 8.7 on 1224. Platelets are normal.  EKG: Shows normal sinus rhythm and no acute findings. UA: On initial urinalysis no evidence of infection. Upon microscopic there are 10-20 white blood cells and 2+ bacteria. I will send this for culture and not prescribe antibiotics at this time. Imaging: Chest x-ray shows no acute process. Redemonstration of large hiatal hernia with air-fluid level. Patient is currently on Brilinta not Plavix. He states he is can to be switched to Plavix once he is down with his current Brilinta prescription. He denies any vomiting. Denies that he is having bloody, black or tarry stools. I did perform a rectal exam and obtain a sample for an occult blood due to his drop in H&H. Consults:  I consulted cardiology and spoke with Dr. Cristal Brandt. He states that he does not feel that this is a cardiac issue and that from his opinion after looking at his EKG, saw that his troponin is normal this is not a cardiac issue. His device is a GI consult based on his H&H. I discussed all of the above with the patient. I offered him admission to further evaluate his low H&H. I explained to him that we could have GI evaluate him to see if this is possibly an upper GI bleed. Patient is not having any reports of abdominal pain no vomiting. His occult blood for stool was negative. Patient opted to be discharged and preferred to follow-up as an outpatient. He was given GI referral as well as primary care referral.  I strongly encouraged him to follow-up with both as quickly as possible. I also gave him strict ED return precautions, patient stated that he would return immediately if any of his symptoms worsened or returned. While in the ED the patient received   Medications   0.9 % sodium chloride bolus (0 mLs IntraVENous Stopped 1/17/22 1150)     Please refer to AVS for further details regarding discharge instructions. A discussion was had with the patient regarding diagnosis, diagnostic testing results, treatment/ plan of care, and follow up. All questions were answered. Patient will follow up as directed for further evaluation/treatment. The patient was given strict return precautions as we discussed symptoms that would necessitate return to the ED. Patient will return to ED for new/worsening symptoms. The patient verbalized their understanding and agreement with the above plan. I estimate there is LOW risk for ACUTE CORONARY SYNDROME, INTRACRANIAL HEMORRHAGE, MALIGNANT DYSRHYTHMIA, MENINGITIS, PNEUMONIA, PULMONARY EMBOLISM, SEPSIS, SUBARACHNOID HEMORRHAGE, SUBDURAL HEMATOMA, STROKE, or URINARY TRACT INFECTION, thus I consider the discharge disposition reasonable.  Heriberto Daniels and I have discussed the diagnosis and risks, and we agree with discharging home to follow-up with their primary doctor. We also discussed returning to the Emergency Department immediately if new or worsening symptoms occur. We have discussed the symptoms which are most concerning (e.g., changing or worsening pain, weakness, vomiting, fever) that necessitate immediate return. Clinical Impression  Final diagnoses:   Dizziness   Diaphoresis   Anemia, unspecified type   Shortness of breath       Patient was sent home with a prescription for below medication/s. I did [place] patient on appropriate use of these medication. Discharge Medication List as of 1/17/2022 11:37 AM      START taking these medications    Details   ferrous sulfate dried (SLOW IRON) 160 (50 Fe) MG TBCR extended release tablet Take 160 mg by mouth 2 times daily, Disp-30 tablet, R-0Normal      pantoprazole (PROTONIX) 20 MG tablet Take 2 tablets by mouth daily, Disp-30 tablet, R-0Normal             FOLLOW UP  Zohreh Pabon, DO  [address] [place] [address] [address] [facility]  [phone]    Call in 1 day  For further evaluation    Jeremy Paris MD  [address] [place] [address] [phone]    Call in 1 day  For further evaluation-[facility]  ED  [address] [facility]  Go to   If symptoms worsen      DISPOSITION  Patient was discharged to home in good condition. Comment: Please note this report has been produced using speech recognition software and may contain errors related to that system including errors in grammar, punctuation, and spelling, as well as words and phrases that may be inappropriate. If there are any questions or concerns please feel free to contact the dictating provider for clarification.        ALLEN Garcia - HALEY  01/17/22 8790

## 2022-01-17 NOTE — LETTER
[address]  Phone: [phone] The Joselito Mendoza MD        January 20, 2022    Renetta Aragon  [address] [facility]      Dear Ramon Briones:    [facility] attempted to contact you several times. However we have been unsuccessful. Please contact the office at you earliest convenience. We would like to relay a message from your provider.          Sincerely,        Joanna Hardy MD

## 2022-01-17 NOTE — Clinical Note
Vijay Horne was seen and treated in our emergency department on 1/17/2022. He may return to work on 01/20/2022. If you have any questions or concerns, please don't hesitate to call.       Jeremias Anderson, ALLEN - CNP

## 2022-01-17 NOTE — ED NOTES
Assisted with rectal exam occult card sent to lab  Pt tolerated well.       Nury Alexander RN  01/17/22 [id number]

## 2022-01-17 NOTE — ED NOTES
@3019 called [facility] cardiology per [facility]  RE: dizziness, pre-syncope  @1120 dr. Laura Brewer called back     Roya Olivares  01/17/22 2368

## 2022-01-17 NOTE — ED NOTES
Bed: 01  Expected date:   Expected time:   Means of arrival:   Comments:  Reed Nieves RN  01/17/22 5146

## 2022-01-17 NOTE — TELEPHONE ENCOUNTER
Lets call pt to let him know needs to establish with PCP due to anemia. Let him know this can be serious issue and if he is not able to establish with pcp he should return to ER for further eval this week. He will need cbc this week as well next week as per prev plans noted from hospital and OV. Lets get him f/u in our office w/ NP as well in next 1-2 wks. Remind him to go to ER this week if not feeling well or if not able to establish w/ pcp to evaluate anemia. Thank you.

## 2022-01-18 LAB — URINE CULTURE, ROUTINE: NORMAL

## 2022-01-20 NOTE — TELEPHONE ENCOUNTER
LMOM for pt to contact the office. Third attempt to contact, I also mailed him a letter to contact the office.

## 2022-01-24 RX ORDER — CLOPIDOGREL BISULFATE 75 MG/1
75 TABLET ORAL DAILY
Qty: 30 TABLET | Refills: 11 | Status: SHIPPED | OUTPATIENT
Start: 2022-01-24

## 2023-05-29 ENCOUNTER — APPOINTMENT (OUTPATIENT)
Dept: CT IMAGING | Age: 63
End: 2023-05-29
Payer: COMMERCIAL

## 2023-05-29 ENCOUNTER — HOSPITAL ENCOUNTER (EMERGENCY)
Age: 63
Discharge: HOME | End: 2023-05-29
Payer: COMMERCIAL

## 2023-05-29 VITALS
TEMPERATURE: 98.1 F | RESPIRATION RATE: 16 BRPM | DIASTOLIC BLOOD PRESSURE: 89 MMHG | SYSTOLIC BLOOD PRESSURE: 114 MMHG | WEIGHT: 200 LBS | BODY MASS INDEX: 28.63 KG/M2 | HEIGHT: 70 IN | OXYGEN SATURATION: 95 % | HEART RATE: 75 BPM

## 2023-05-29 DIAGNOSIS — K40.90 RIGHT INGUINAL HERNIA: Primary | ICD-10-CM

## 2023-05-29 DIAGNOSIS — K86.9 PANCREATIC LESION: ICD-10-CM

## 2023-05-29 LAB
ALBUMIN SERPL-MCNC: 4 G/DL (ref 3.4–5)
ALBUMIN/GLOB SERPL: 1.3 {RATIO} (ref 1.1–2.2)
ALP SERPL-CCNC: 47 U/L (ref 40–129)
ALT SERPL-CCNC: 19 U/L (ref 10–40)
ANION GAP SERPL CALCULATED.3IONS-SCNC: 9 MMOL/L (ref 3–16)
AST SERPL-CCNC: 18 U/L (ref 15–37)
BASOPHILS # BLD: 0.1 K/UL (ref 0–0.2)
BASOPHILS NFR BLD: 1.2 %
BILIRUB SERPL-MCNC: <0.2 MG/DL (ref 0–1)
BILIRUB UR QL STRIP.AUTO: NEGATIVE
BUN SERPL-MCNC: 19 MG/DL (ref 7–20)
CALCIUM SERPL-MCNC: 9.2 MG/DL (ref 8.3–10.6)
CHLORIDE SERPL-SCNC: 106 MMOL/L (ref 99–110)
CLARITY UR: CLEAR
CO2 SERPL-SCNC: 22 MMOL/L (ref 21–32)
COLOR UR: YELLOW
CREAT SERPL-MCNC: 0.9 MG/DL (ref 0.8–1.3)
DEPRECATED RDW RBC AUTO: 16 % (ref 12.4–15.4)
EOSINOPHIL # BLD: 0.1 K/UL (ref 0–0.6)
EOSINOPHIL NFR BLD: 1.4 %
GFR SERPLBLD CREATININE-BSD FMLA CKD-EPI: >60 ML/MIN/{1.73_M2}
GLUCOSE SERPL-MCNC: 102 MG/DL (ref 70–99)
GLUCOSE UR STRIP.AUTO-MCNC: NEGATIVE MG/DL
HCT VFR BLD AUTO: 44.9 % (ref 40.5–52.5)
HGB BLD-MCNC: 14.9 G/DL (ref 13.5–17.5)
HGB UR QL STRIP.AUTO: NEGATIVE
KETONES UR STRIP.AUTO-MCNC: NEGATIVE MG/DL
LEUKOCYTE ESTERASE UR QL STRIP.AUTO: NEGATIVE
LYMPHOCYTES # BLD: 2.1 K/UL (ref 1–5.1)
LYMPHOCYTES NFR BLD: 31.2 %
MCH RBC QN AUTO: 29.9 PG (ref 26–34)
MCHC RBC AUTO-ENTMCNC: 33.3 G/DL (ref 31–36)
MCV RBC AUTO: 89.7 FL (ref 80–100)
MONOCYTES # BLD: 0.6 K/UL (ref 0–1.3)
MONOCYTES NFR BLD: 8.8 %
NEUTROPHILS # BLD: 3.8 K/UL (ref 1.7–7.7)
NEUTROPHILS NFR BLD: 57.4 %
NITRITE UR QL STRIP.AUTO: NEGATIVE
PH UR STRIP.AUTO: 5 [PH] (ref 5–8)
PLATELET # BLD AUTO: 219 K/UL (ref 135–450)
PMV BLD AUTO: 9.6 FL (ref 5–10.5)
POTASSIUM SERPL-SCNC: 4.3 MMOL/L (ref 3.5–5.1)
PROT SERPL-MCNC: 7 G/DL (ref 6.4–8.2)
PROT UR STRIP.AUTO-MCNC: NEGATIVE MG/DL
RBC # BLD AUTO: 5 M/UL (ref 4.2–5.9)
SODIUM SERPL-SCNC: 137 MMOL/L (ref 136–145)
SP GR UR STRIP.AUTO: >=1.03 (ref 1–1.03)
UA COMPLETE W REFLEX CULTURE PNL UR: NORMAL
UA DIPSTICK W REFLEX MICRO PNL UR: NORMAL
URN SPEC COLLECT METH UR: NORMAL
UROBILINOGEN UR STRIP-ACNC: 0.2 E.U./DL
WBC # BLD AUTO: 6.7 K/UL (ref 4–11)

## 2023-05-29 PROCEDURE — 74177 CT ABD & PELVIS W/CONTRAST: CPT

## 2023-05-29 PROCEDURE — 81003 URINALYSIS AUTO W/O SCOPE: CPT

## 2023-05-29 PROCEDURE — 36415 COLL VENOUS BLD VENIPUNCTURE: CPT

## 2023-05-29 PROCEDURE — 85025 COMPLETE CBC W/AUTO DIFF WBC: CPT

## 2023-05-29 PROCEDURE — 80053 COMPREHEN METABOLIC PANEL: CPT

## 2023-05-29 PROCEDURE — 6360000004 HC RX CONTRAST MEDICATION: Performed by: NURSE PRACTITIONER

## 2023-05-29 PROCEDURE — 99285 EMERGENCY DEPT VISIT HI MDM: CPT

## 2023-05-29 RX ADMIN — IOPAMIDOL 75 ML: 755 INJECTION, SOLUTION INTRAVENOUS at 10:53

## 2023-05-29 ASSESSMENT — PAIN SCALES - GENERAL: PAINLEVEL_OUTOF10: 0

## 2023-05-29 ASSESSMENT — ENCOUNTER SYMPTOMS
SHORTNESS OF BREATH: 0
FACIAL SWELLING: 0
VOMITING: 0
SORE THROAT: 0
NAUSEA: 0
RHINORRHEA: 0
ABDOMINAL PAIN: 1
COLOR CHANGE: 0
DIARRHEA: 0

## 2023-05-29 ASSESSMENT — LIFESTYLE VARIABLES
HOW MANY STANDARD DRINKS CONTAINING ALCOHOL DO YOU HAVE ON A TYPICAL DAY: 1 OR 2
HOW OFTEN DO YOU HAVE A DRINK CONTAINING ALCOHOL: MONTHLY OR LESS

## 2023-05-29 NOTE — ED PROVIDER NOTES
[address] ED  EMERGENCY DEPARTMENT ENCOUNTER      I am the Primary Clinician of Record    Note started: 10:02 AM EDT 5/29/23    OSCAR. I have evaluated this patient. Pt Name: Neda Horne  MRN: [id number]  [address] 1960  Dateof evaluation: 5/29/2023  Provider: ALLEN Mahmood CNP  PCP: No primary care provider on file. ED Attending: No att. providers found      [address] [place] [address]       Chief Complaint   Patient presents with    Inguinal Hernia     C/o pain from right inguinal hernia past couple of months. Had surgical repair of right inguinal hernia about 8 yrs ago. Unsure if it is stuck, has not tried to push it back in. No worse today, just getting around to getting it checked today. Does not feel ill. HISTORY OF PRESENTILLNESS   (Location/Symptom, Timing/Onset, Context/Setting, Quality, Duration, Modifying Factors, Severity)  Note limiting factors. Neda Horne is a 59 y.o. male for right inguinal canal. Onset was 3 to 6 months. Context includes right inguinal hernia repaired a while ago. He reports over the last 3 to 6 months he had increased pain and swelling. Patient reports that he comes into the ED today because he has \"gotten settled with his job \". Patient reports that he did not want to risk his job and that is why he has not come in. 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 Patient denies any fevers. Alleviating factors include nothing. Aggravating factors include nothing. Pain is 0/10. Nothing has been used for pain today. Nursing Notes were all reviewed and agreed with or any disagreements were addressed  in the HPI. REVIEW OF SYSTEMS       Review of Systems   Constitutional:  Negative for activity change, appetite change and fever. HENT:  Negative for congestion, facial swelling, rhinorrhea and sore throat. Eyes:  Negative for visual disturbance.    Respiratory:

## 2023-06-06 PROBLEM — E78.5 HYPERLIPIDEMIA: Status: ACTIVE | Noted: 2022-03-09

## 2023-06-06 PROBLEM — I25.10 CORONARY ARTERIOSCLEROSIS: Status: ACTIVE | Noted: 2022-03-09

## 2023-06-06 PROBLEM — D50.9 IRON DEFICIENCY ANEMIA: Status: ACTIVE | Noted: 2022-03-10

## 2023-06-08 ENCOUNTER — INITIAL CONSULT (OUTPATIENT)
Dept: SURGERY | Age: 63
End: 2023-06-08

## 2023-06-08 VITALS
DIASTOLIC BLOOD PRESSURE: 80 MMHG | BODY MASS INDEX: 28.2 KG/M2 | WEIGHT: 197 LBS | HEIGHT: 70 IN | SYSTOLIC BLOOD PRESSURE: 122 MMHG

## 2023-06-08 DIAGNOSIS — K40.90 LEFT INGUINAL HERNIA: ICD-10-CM

## 2023-06-08 DIAGNOSIS — K40.91 RECURRENT RIGHT INGUINAL HERNIA: Primary | ICD-10-CM

## 2023-06-08 RX ORDER — HEPARIN SODIUM 5000 [USP'U]/ML
5000 INJECTION, SOLUTION INTRAVENOUS; SUBCUTANEOUS ONCE
OUTPATIENT
Start: 2023-06-08 | End: 2023-06-08

## 2023-06-08 RX ORDER — SODIUM CHLORIDE 0.9 % (FLUSH) 0.9 %
5-40 SYRINGE (ML) INJECTION EVERY 12 HOURS SCHEDULED
OUTPATIENT
Start: 2023-06-08

## 2023-06-08 RX ORDER — SODIUM CHLORIDE 9 MG/ML
INJECTION, SOLUTION INTRAVENOUS PRN
OUTPATIENT
Start: 2023-06-08

## 2023-06-08 RX ORDER — SODIUM CHLORIDE 0.9 % (FLUSH) 0.9 %
5-40 SYRINGE (ML) INJECTION PRN
OUTPATIENT
Start: 2023-06-08

## 2023-06-08 NOTE — PROGRESS NOTES
New Patient Via Praneeth Coronado MD    [address], [address] [facility]  [facility]  [phone]    Odell Daniels   YOB: 1960    Date of Visit:  2023    Júnior    Chief Complaint: Right groin pain and bulge    HPI: Patient presents for evaluation of pain and a bulge in his right groin. He states he has had this for some time, but recently it has become more uncomfortable. He has a lot of pain if he lifts or strains. It is also uncomfortable if he is on his feet for a long time. When he lays down, the hernia goes back in. He denies nausea or vomiting. His bowels have been working normally    No Known Allergies  Outpatient Medications Marked as Taking for the 23 encounter (Initial consult) with Gage Esteban MD   Medication Sig Dispense Refill    metoprolol tartrate (LOPRESSOR) 25 MG tablet Take 0.5 tablets by mouth 2 times daily 60 tablet 3       Past Medical History:   Diagnosis Date    Reflux      Past Surgical History:   Procedure Laterality Date    APPENDECTOMY      CARDIAC SURGERY      HERNIA REPAIR Right 14    Right Inguinal Hernia Repair    TONSILLECTOMY       No family history on file.   Social History     Socioeconomic History    Marital status: Single     Spouse name: Not on file    Number of children: Not on file    Years of education: Not on file    Highest education level: Not on file   Occupational History    Not on file   Tobacco Use    Smoking status: Former     Packs/day: 0.00     Years: 40.00     Pack years: 0.00     Types: Cigarettes     Quit date:      Years since quittin.4    Smokeless tobacco: Never   Vaping Use    Vaping Use: Former   Substance and Sexual Activity    Alcohol use: No    Drug use: Yes     Types: Marijuana (Weed)     Comment: trouble sleeping    Sexual activity: Not on file   Other Topics Concern    Not on file   Social History Narrative    Not on file     Social

## 2023-06-19 ENCOUNTER — HOSPITAL ENCOUNTER (OUTPATIENT)
Age: 63
Setting detail: OBSERVATION
Discharge: HOME | End: 2023-06-20
Attending: EMERGENCY MEDICINE | Admitting: INTERNAL MEDICINE
Payer: COMMERCIAL

## 2023-06-19 ENCOUNTER — APPOINTMENT (OUTPATIENT)
Dept: GENERAL RADIOLOGY | Age: 63
End: 2023-06-19
Payer: COMMERCIAL

## 2023-06-19 DIAGNOSIS — R07.9 CHEST PAIN, UNSPECIFIED TYPE: Primary | ICD-10-CM

## 2023-06-19 LAB
ALBUMIN SERPL-MCNC: 4.3 G/DL (ref 3.4–5)
ALBUMIN/GLOB SERPL: 1.3 {RATIO} (ref 1.1–2.2)
ALP SERPL-CCNC: 53 U/L (ref 40–129)
ALT SERPL-CCNC: 14 U/L (ref 10–40)
ANION GAP SERPL CALCULATED.3IONS-SCNC: 11 MMOL/L (ref 3–16)
AST SERPL-CCNC: 19 U/L (ref 15–37)
BASOPHILS # BLD: 0.1 K/UL (ref 0–0.2)
BASOPHILS NFR BLD: 0.7 %
BILIRUB SERPL-MCNC: 0.4 MG/DL (ref 0–1)
BUN SERPL-MCNC: 24 MG/DL (ref 7–20)
CALCIUM SERPL-MCNC: 9.4 MG/DL (ref 8.3–10.6)
CHLORIDE SERPL-SCNC: 105 MMOL/L (ref 99–110)
CO2 SERPL-SCNC: 20 MMOL/L (ref 21–32)
CREAT SERPL-MCNC: 1 MG/DL (ref 0.8–1.3)
D DIMER: 0.54 UG/ML FEU (ref 0–0.6)
DEPRECATED RDW RBC AUTO: 15.4 % (ref 12.4–15.4)
EKG ATRIAL RATE: 58 BPM
EKG DIAGNOSIS: NORMAL
EKG P AXIS: 56 DEGREES
EKG P-R INTERVAL: 126 MS
EKG Q-T INTERVAL: 474 MS
EKG QRS DURATION: 78 MS
EKG QTC CALCULATION (BAZETT): 465 MS
EKG R AXIS: 65 DEGREES
EKG T AXIS: 58 DEGREES
EKG VENTRICULAR RATE: 58 BPM
EOSINOPHIL # BLD: 0.1 K/UL (ref 0–0.6)
EOSINOPHIL NFR BLD: 0.5 %
GFR SERPLBLD CREATININE-BSD FMLA CKD-EPI: >60 ML/MIN/{1.73_M2}
GLUCOSE SERPL-MCNC: 117 MG/DL (ref 70–99)
HCT VFR BLD AUTO: 45.1 % (ref 40.5–52.5)
HGB BLD-MCNC: 15.1 G/DL (ref 13.5–17.5)
LYMPHOCYTES # BLD: 1.7 K/UL (ref 1–5.1)
LYMPHOCYTES NFR BLD: 11.9 %
MCH RBC QN AUTO: 29.7 PG (ref 26–34)
MCHC RBC AUTO-ENTMCNC: 33.4 G/DL (ref 31–36)
MCV RBC AUTO: 88.8 FL (ref 80–100)
MONOCYTES # BLD: 0.7 K/UL (ref 0–1.3)
MONOCYTES NFR BLD: 5.3 %
NEUTROPHILS # BLD: 11.4 K/UL (ref 1.7–7.7)
NEUTROPHILS NFR BLD: 81.6 %
NT-PROBNP SERPL-MCNC: 95 PG/ML (ref 0–124)
PLATELET # BLD AUTO: 227 K/UL (ref 135–450)
PMV BLD AUTO: 9.3 FL (ref 5–10.5)
POTASSIUM SERPL-SCNC: 4 MMOL/L (ref 3.5–5.1)
PROT SERPL-MCNC: 7.7 G/DL (ref 6.4–8.2)
RBC # BLD AUTO: 5.08 M/UL (ref 4.2–5.9)
SODIUM SERPL-SCNC: 136 MMOL/L (ref 136–145)
TROPONIN, HIGH SENSITIVITY: 18 NG/L (ref 0–22)
TROPONIN, HIGH SENSITIVITY: 20 NG/L (ref 0–22)
WBC # BLD AUTO: 14 K/UL (ref 4–11)

## 2023-06-19 PROCEDURE — 6370000000 HC RX 637 (ALT 250 FOR IP): Performed by: REGISTERED NURSE

## 2023-06-19 PROCEDURE — 36415 COLL VENOUS BLD VENIPUNCTURE: CPT

## 2023-06-19 PROCEDURE — 6360000002 HC RX W HCPCS: Performed by: INTERNAL MEDICINE

## 2023-06-19 PROCEDURE — G0378 HOSPITAL OBSERVATION PER HR: HCPCS

## 2023-06-19 PROCEDURE — 96376 TX/PRO/DX INJ SAME DRUG ADON: CPT

## 2023-06-19 PROCEDURE — 85025 COMPLETE CBC W/AUTO DIFF WBC: CPT

## 2023-06-19 PROCEDURE — 71046 X-RAY EXAM CHEST 2 VIEWS: CPT

## 2023-06-19 PROCEDURE — 83880 ASSAY OF NATRIURETIC PEPTIDE: CPT

## 2023-06-19 PROCEDURE — 85379 FIBRIN DEGRADATION QUANT: CPT

## 2023-06-19 PROCEDURE — 96375 TX/PRO/DX INJ NEW DRUG ADDON: CPT

## 2023-06-19 PROCEDURE — 6370000000 HC RX 637 (ALT 250 FOR IP): Performed by: INTERNAL MEDICINE

## 2023-06-19 PROCEDURE — 80053 COMPREHEN METABOLIC PANEL: CPT

## 2023-06-19 PROCEDURE — 2580000003 HC RX 258: Performed by: INTERNAL MEDICINE

## 2023-06-19 PROCEDURE — 99285 EMERGENCY DEPT VISIT HI MDM: CPT

## 2023-06-19 PROCEDURE — 6360000002 HC RX W HCPCS: Performed by: REGISTERED NURSE

## 2023-06-19 PROCEDURE — 84484 ASSAY OF TROPONIN QUANT: CPT

## 2023-06-19 PROCEDURE — 2580000003 HC RX 258: Performed by: REGISTERED NURSE

## 2023-06-19 PROCEDURE — 96374 THER/PROPH/DIAG INJ IV PUSH: CPT

## 2023-06-19 PROCEDURE — 93005 ELECTROCARDIOGRAM TRACING: CPT | Performed by: REGISTERED NURSE

## 2023-06-19 PROCEDURE — 93010 ELECTROCARDIOGRAM REPORT: CPT | Performed by: INTERNAL MEDICINE

## 2023-06-19 RX ORDER — POLYETHYLENE GLYCOL 3350 17 G/17G
17 POWDER, FOR SOLUTION ORAL DAILY PRN
Status: DISCONTINUED | OUTPATIENT
Start: 2023-06-19 | End: 2023-06-20 | Stop reason: HOSPADM

## 2023-06-19 RX ORDER — MORPHINE SULFATE 4 MG/ML
4 INJECTION, SOLUTION INTRAMUSCULAR; INTRAVENOUS ONCE
Status: COMPLETED | OUTPATIENT
Start: 2023-06-19 | End: 2023-06-19

## 2023-06-19 RX ORDER — NICOTINE 21 MG/24HR
1 PATCH, TRANSDERMAL 24 HOURS TRANSDERMAL DAILY
Status: DISCONTINUED | OUTPATIENT
Start: 2023-06-19 | End: 2023-06-20 | Stop reason: HOSPADM

## 2023-06-19 RX ORDER — ASPIRIN 81 MG/1
81 TABLET, CHEWABLE ORAL DAILY
Status: DISCONTINUED | OUTPATIENT
Start: 2023-06-19 | End: 2023-06-19 | Stop reason: SDUPTHER

## 2023-06-19 RX ORDER — PANTOPRAZOLE SODIUM 40 MG/1
40 TABLET, DELAYED RELEASE ORAL
Status: DISCONTINUED | OUTPATIENT
Start: 2023-06-19 | End: 2023-06-20 | Stop reason: HOSPADM

## 2023-06-19 RX ORDER — CLOPIDOGREL BISULFATE 75 MG/1
75 TABLET ORAL DAILY
Status: DISCONTINUED | OUTPATIENT
Start: 2023-06-19 | End: 2023-06-20 | Stop reason: HOSPADM

## 2023-06-19 RX ORDER — ATORVASTATIN CALCIUM 40 MG/1
40 TABLET, FILM COATED ORAL NIGHTLY
Status: DISCONTINUED | OUTPATIENT
Start: 2023-06-19 | End: 2023-06-19 | Stop reason: DRUGHIGH

## 2023-06-19 RX ORDER — NITROGLYCERIN 0.4 MG/1
0.4 TABLET SUBLINGUAL EVERY 5 MIN PRN
Status: DISCONTINUED | OUTPATIENT
Start: 2023-06-19 | End: 2023-06-19 | Stop reason: HOSPADM

## 2023-06-19 RX ORDER — ONDANSETRON 4 MG/1
4 TABLET, ORALLY DISINTEGRATING ORAL EVERY 8 HOURS PRN
Status: DISCONTINUED | OUTPATIENT
Start: 2023-06-19 | End: 2023-06-20 | Stop reason: HOSPADM

## 2023-06-19 RX ORDER — MORPHINE SULFATE 2 MG/ML
2 INJECTION, SOLUTION INTRAMUSCULAR; INTRAVENOUS ONCE
Status: COMPLETED | OUTPATIENT
Start: 2023-06-19 | End: 2023-06-19

## 2023-06-19 RX ORDER — ASPIRIN 81 MG/1
324 TABLET, CHEWABLE ORAL ONCE
Status: COMPLETED | OUTPATIENT
Start: 2023-06-19 | End: 2023-06-19

## 2023-06-19 RX ORDER — MAGNESIUM SULFATE IN WATER 40 MG/ML
2000 INJECTION, SOLUTION INTRAVENOUS PRN
Status: DISCONTINUED | OUTPATIENT
Start: 2023-06-19 | End: 2023-06-20 | Stop reason: HOSPADM

## 2023-06-19 RX ORDER — HYDROCODONE BITARTRATE AND ACETAMINOPHEN 5; 325 MG/1; MG/1
1 TABLET ORAL EVERY 6 HOURS PRN
Status: DISCONTINUED | OUTPATIENT
Start: 2023-06-19 | End: 2023-06-20 | Stop reason: HOSPADM

## 2023-06-19 RX ORDER — FERROUS SULFATE 325(65) MG
325 TABLET ORAL 2 TIMES DAILY WITH MEALS
Status: DISCONTINUED | OUTPATIENT
Start: 2023-06-20 | End: 2023-06-20 | Stop reason: HOSPADM

## 2023-06-19 RX ORDER — ACETAMINOPHEN 325 MG/1
650 TABLET ORAL EVERY 6 HOURS PRN
Status: DISCONTINUED | OUTPATIENT
Start: 2023-06-19 | End: 2023-06-20 | Stop reason: HOSPADM

## 2023-06-19 RX ORDER — SODIUM CHLORIDE 0.9 % (FLUSH) 0.9 %
5-40 SYRINGE (ML) INJECTION PRN
Status: DISCONTINUED | OUTPATIENT
Start: 2023-06-19 | End: 2023-06-20 | Stop reason: HOSPADM

## 2023-06-19 RX ORDER — NITROGLYCERIN 0.4 MG/1
0.4 TABLET SUBLINGUAL EVERY 5 MIN PRN
Status: DISCONTINUED | OUTPATIENT
Start: 2023-06-19 | End: 2023-06-20 | Stop reason: HOSPADM

## 2023-06-19 RX ORDER — ACETAMINOPHEN 650 MG/1
650 SUPPOSITORY RECTAL EVERY 6 HOURS PRN
Status: DISCONTINUED | OUTPATIENT
Start: 2023-06-19 | End: 2023-06-20 | Stop reason: HOSPADM

## 2023-06-19 RX ORDER — SODIUM CHLORIDE 9 MG/ML
INJECTION, SOLUTION INTRAVENOUS PRN
Status: DISCONTINUED | OUTPATIENT
Start: 2023-06-19 | End: 2023-06-20 | Stop reason: HOSPADM

## 2023-06-19 RX ORDER — ONDANSETRON 2 MG/ML
4 INJECTION INTRAMUSCULAR; INTRAVENOUS EVERY 6 HOURS PRN
Status: DISCONTINUED | OUTPATIENT
Start: 2023-06-19 | End: 2023-06-20 | Stop reason: HOSPADM

## 2023-06-19 RX ORDER — POTASSIUM CHLORIDE 7.45 MG/ML
10 INJECTION INTRAVENOUS PRN
Status: DISCONTINUED | OUTPATIENT
Start: 2023-06-19 | End: 2023-06-20 | Stop reason: HOSPADM

## 2023-06-19 RX ORDER — ASPIRIN 81 MG/1
81 TABLET, CHEWABLE ORAL DAILY
Status: DISCONTINUED | OUTPATIENT
Start: 2023-06-20 | End: 2023-06-20 | Stop reason: HOSPADM

## 2023-06-19 RX ORDER — ATORVASTATIN CALCIUM 40 MG/1
80 TABLET, FILM COATED ORAL NIGHTLY
Status: DISCONTINUED | OUTPATIENT
Start: 2023-06-19 | End: 2023-06-20 | Stop reason: HOSPADM

## 2023-06-19 RX ORDER — POTASSIUM CHLORIDE 20 MEQ/1
40 TABLET, EXTENDED RELEASE ORAL PRN
Status: DISCONTINUED | OUTPATIENT
Start: 2023-06-19 | End: 2023-06-20 | Stop reason: HOSPADM

## 2023-06-19 RX ORDER — ENOXAPARIN SODIUM 100 MG/ML
40 INJECTION SUBCUTANEOUS NIGHTLY
Status: DISCONTINUED | OUTPATIENT
Start: 2023-06-19 | End: 2023-06-20 | Stop reason: HOSPADM

## 2023-06-19 RX ORDER — SODIUM CHLORIDE 0.9 % (FLUSH) 0.9 %
5-40 SYRINGE (ML) INJECTION EVERY 12 HOURS SCHEDULED
Status: DISCONTINUED | OUTPATIENT
Start: 2023-06-19 | End: 2023-06-20 | Stop reason: HOSPADM

## 2023-06-19 RX ORDER — 0.9 % SODIUM CHLORIDE 0.9 %
1000 INTRAVENOUS SOLUTION INTRAVENOUS ONCE
Status: COMPLETED | OUTPATIENT
Start: 2023-06-19 | End: 2023-06-19

## 2023-06-19 RX ADMIN — ASPIRIN 324 MG: 81 TABLET, CHEWABLE ORAL at 14:32

## 2023-06-19 RX ADMIN — NITROGLYCERIN 0.4 MG: 0.4 TABLET SUBLINGUAL at 16:06

## 2023-06-19 RX ADMIN — HYDROCODONE BITARTRATE AND ACETAMINOPHEN 1 TABLET: 5; 325 TABLET ORAL at 22:44

## 2023-06-19 RX ADMIN — SODIUM CHLORIDE, PRESERVATIVE FREE 10 ML: 5 INJECTION INTRAVENOUS at 22:46

## 2023-06-19 RX ADMIN — MORPHINE SULFATE 2 MG: 2 INJECTION, SOLUTION INTRAMUSCULAR; INTRAVENOUS at 17:37

## 2023-06-19 RX ADMIN — CLOPIDOGREL BISULFATE 75 MG: 75 TABLET ORAL at 22:40

## 2023-06-19 RX ADMIN — MORPHINE SULFATE 4 MG: 4 INJECTION, SOLUTION INTRAMUSCULAR; INTRAVENOUS at 15:36

## 2023-06-19 RX ADMIN — METOPROLOL TARTRATE 12.5 MG: 25 TABLET, FILM COATED ORAL at 22:40

## 2023-06-19 RX ADMIN — ATORVASTATIN CALCIUM 80 MG: 40 TABLET, FILM COATED ORAL at 22:40

## 2023-06-19 RX ADMIN — SODIUM CHLORIDE 1000 ML: 9 INJECTION, SOLUTION INTRAVENOUS at 16:10

## 2023-06-19 RX ADMIN — PANTOPRAZOLE SODIUM 40 MG: 40 TABLET, DELAYED RELEASE ORAL at 22:40

## 2023-06-19 ASSESSMENT — PAIN SCALES - GENERAL
PAINLEVEL_OUTOF10: 4
PAINLEVEL_OUTOF10: 10
PAINLEVEL_OUTOF10: 8

## 2023-06-19 ASSESSMENT — PAIN - FUNCTIONAL ASSESSMENT
PAIN_FUNCTIONAL_ASSESSMENT: ACTIVITIES ARE NOT PREVENTED
PAIN_FUNCTIONAL_ASSESSMENT: PREVENTS OR INTERFERES SOME ACTIVE ACTIVITIES AND ADLS
PAIN_FUNCTIONAL_ASSESSMENT: 0-10

## 2023-06-19 ASSESSMENT — PAIN DESCRIPTION - ORIENTATION
ORIENTATION: RIGHT;UPPER
ORIENTATION: LEFT

## 2023-06-19 ASSESSMENT — ENCOUNTER SYMPTOMS
NAUSEA: 0
CHEST TIGHTNESS: 0
RHINORRHEA: 0
BACK PAIN: 0
ABDOMINAL PAIN: 0
SORE THROAT: 0
VOMITING: 0
CONSTIPATION: 0
SHORTNESS OF BREATH: 0
DIARRHEA: 0
COUGH: 0

## 2023-06-19 ASSESSMENT — HEART SCORE: ECG: 0

## 2023-06-19 ASSESSMENT — PAIN DESCRIPTION - LOCATION
LOCATION: ABDOMEN
LOCATION: RIB CAGE

## 2023-06-19 ASSESSMENT — LIFESTYLE VARIABLES
HOW OFTEN DO YOU HAVE A DRINK CONTAINING ALCOHOL: NEVER
HOW MANY STANDARD DRINKS CONTAINING ALCOHOL DO YOU HAVE ON A TYPICAL DAY: PATIENT DOES NOT DRINK

## 2023-06-19 ASSESSMENT — PAIN DESCRIPTION - FREQUENCY: FREQUENCY: CONTINUOUS

## 2023-06-19 ASSESSMENT — PAIN DESCRIPTION - DESCRIPTORS: DESCRIPTORS: ACHING;SHARP;SHOOTING;THROBBING

## 2023-06-19 ASSESSMENT — PAIN DESCRIPTION - ONSET: ONSET: ON-GOING

## 2023-06-19 ASSESSMENT — PAIN DESCRIPTION - PAIN TYPE: TYPE: ACUTE PAIN

## 2023-06-19 NOTE — PLAN OF CARE
History of CAD, prior coronary stents- ( 2004). History of hypertension,  hyperlipidemia. He is not taking any of his medications. Presenting with right-sided chest pain, atypical.  Not reproducible. Troponin negative, D-dimer negative, EKG normal, chest x-ray negative.    -Observation  Check stress test in a.m.

## 2023-06-20 ENCOUNTER — APPOINTMENT (OUTPATIENT)
Dept: NUCLEAR MEDICINE | Age: 63
End: 2023-06-20
Payer: COMMERCIAL

## 2023-06-20 ENCOUNTER — APPOINTMENT (OUTPATIENT)
Dept: CT IMAGING | Age: 63
End: 2023-06-20
Payer: COMMERCIAL

## 2023-06-20 VITALS
HEIGHT: 69 IN | BODY MASS INDEX: 29.18 KG/M2 | SYSTOLIC BLOOD PRESSURE: 101 MMHG | TEMPERATURE: 97.9 F | WEIGHT: 197 LBS | DIASTOLIC BLOOD PRESSURE: 66 MMHG | RESPIRATION RATE: 15 BRPM | HEART RATE: 59 BPM | OXYGEN SATURATION: 93 %

## 2023-06-20 PROBLEM — R10.9 FLANK PAIN: Status: ACTIVE | Noted: 2023-06-20

## 2023-06-20 PROBLEM — R31.9 HEMATURIA: Status: ACTIVE | Noted: 2023-06-20

## 2023-06-20 PROBLEM — K21.9 GERD (GASTROESOPHAGEAL REFLUX DISEASE): Status: ACTIVE | Noted: 2023-06-20

## 2023-06-20 PROBLEM — I10 HTN (HYPERTENSION): Status: ACTIVE | Noted: 2023-06-20

## 2023-06-20 LAB
ANION GAP SERPL CALCULATED.3IONS-SCNC: 10 MMOL/L (ref 3–16)
ANION GAP SERPL CALCULATED.3IONS-SCNC: 11 MMOL/L (ref 3–16)
BILIRUB UR QL STRIP.AUTO: NEGATIVE
BUN SERPL-MCNC: 17 MG/DL (ref 7–20)
BUN SERPL-MCNC: 20 MG/DL (ref 7–20)
CALCIUM SERPL-MCNC: 9.1 MG/DL (ref 8.3–10.6)
CALCIUM SERPL-MCNC: 9.4 MG/DL (ref 8.3–10.6)
CHLORIDE SERPL-SCNC: 104 MMOL/L (ref 99–110)
CHLORIDE SERPL-SCNC: 105 MMOL/L (ref 99–110)
CHOLEST SERPL-MCNC: 204 MG/DL (ref 0–199)
CLARITY UR: CLEAR
CO2 SERPL-SCNC: 20 MMOL/L (ref 21–32)
CO2 SERPL-SCNC: 21 MMOL/L (ref 21–32)
COLOR UR: YELLOW
CREAT SERPL-MCNC: 0.8 MG/DL (ref 0.8–1.3)
CREAT SERPL-MCNC: 0.8 MG/DL (ref 0.8–1.3)
DEPRECATED RDW RBC AUTO: 15.7 % (ref 12.4–15.4)
EKG ATRIAL RATE: 57 BPM
EKG DIAGNOSIS: NORMAL
EKG P AXIS: 25 DEGREES
EKG P-R INTERVAL: 132 MS
EKG Q-T INTERVAL: 456 MS
EKG QRS DURATION: 82 MS
EKG QTC CALCULATION (BAZETT): 443 MS
EKG R AXIS: 57 DEGREES
EKG T AXIS: 20 DEGREES
EKG VENTRICULAR RATE: 57 BPM
GFR SERPLBLD CREATININE-BSD FMLA CKD-EPI: >60 ML/MIN/{1.73_M2}
GFR SERPLBLD CREATININE-BSD FMLA CKD-EPI: >60 ML/MIN/{1.73_M2}
GLUCOSE SERPL-MCNC: 96 MG/DL (ref 70–99)
GLUCOSE SERPL-MCNC: 97 MG/DL (ref 70–99)
GLUCOSE UR STRIP.AUTO-MCNC: NEGATIVE MG/DL
HCT VFR BLD AUTO: 45.3 % (ref 40.5–52.5)
HDLC SERPL-MCNC: 35 MG/DL (ref 40–60)
HGB BLD-MCNC: 15.1 G/DL (ref 13.5–17.5)
HGB UR QL STRIP.AUTO: ABNORMAL
KETONES UR STRIP.AUTO-MCNC: NEGATIVE MG/DL
LDLC SERPL CALC-MCNC: 150 MG/DL
LEUKOCYTE ESTERASE UR QL STRIP.AUTO: NEGATIVE
MCH RBC QN AUTO: 29.9 PG (ref 26–34)
MCHC RBC AUTO-ENTMCNC: 33.4 G/DL (ref 31–36)
MCV RBC AUTO: 89.3 FL (ref 80–100)
NITRITE UR QL STRIP.AUTO: NEGATIVE
PH UR STRIP.AUTO: 6 [PH] (ref 5–8)
PLATELET # BLD AUTO: 226 K/UL (ref 135–450)
PMV BLD AUTO: 9.7 FL (ref 5–10.5)
POTASSIUM SERPL-SCNC: 3.8 MMOL/L (ref 3.5–5.1)
POTASSIUM SERPL-SCNC: 4.2 MMOL/L (ref 3.5–5.1)
PROT UR STRIP.AUTO-MCNC: NEGATIVE MG/DL
RBC # BLD AUTO: 5.07 M/UL (ref 4.2–5.9)
RBC #/AREA URNS HPF: NORMAL /HPF (ref 0–4)
SODIUM SERPL-SCNC: 135 MMOL/L (ref 136–145)
SODIUM SERPL-SCNC: 136 MMOL/L (ref 136–145)
SP GR UR STRIP.AUTO: 1.01 (ref 1–1.03)
TRIGL SERPL-MCNC: 97 MG/DL (ref 0–150)
TROPONIN, HIGH SENSITIVITY: 23 NG/L (ref 0–22)
UA COMPLETE W REFLEX CULTURE PNL UR: ABNORMAL
UA DIPSTICK W REFLEX MICRO PNL UR: YES
URN SPEC COLLECT METH UR: ABNORMAL
UROBILINOGEN UR STRIP-ACNC: 0.2 E.U./DL
VLDLC SERPL CALC-MCNC: 19 MG/DL
WBC # BLD AUTO: 11.2 K/UL (ref 4–11)
WBC #/AREA URNS HPF: NORMAL /HPF (ref 0–5)

## 2023-06-20 PROCEDURE — G0378 HOSPITAL OBSERVATION PER HR: HCPCS

## 2023-06-20 PROCEDURE — 85027 COMPLETE CBC AUTOMATED: CPT

## 2023-06-20 PROCEDURE — 6370000000 HC RX 637 (ALT 250 FOR IP): Performed by: INTERNAL MEDICINE

## 2023-06-20 PROCEDURE — 99233 SBSQ HOSP IP/OBS HIGH 50: CPT

## 2023-06-20 PROCEDURE — 2580000003 HC RX 258: Performed by: INTERNAL MEDICINE

## 2023-06-20 PROCEDURE — 80061 LIPID PANEL: CPT

## 2023-06-20 PROCEDURE — 36415 COLL VENOUS BLD VENIPUNCTURE: CPT

## 2023-06-20 PROCEDURE — 99214 OFFICE O/P EST MOD 30 MIN: CPT | Performed by: INTERNAL MEDICINE

## 2023-06-20 PROCEDURE — 84484 ASSAY OF TROPONIN QUANT: CPT

## 2023-06-20 PROCEDURE — 74176 CT ABD & PELVIS W/O CONTRAST: CPT

## 2023-06-20 PROCEDURE — 80048 BASIC METABOLIC PNL TOTAL CA: CPT

## 2023-06-20 PROCEDURE — 81001 URINALYSIS AUTO W/SCOPE: CPT

## 2023-06-20 RX ADMIN — ASPIRIN 81 MG: 81 TABLET, CHEWABLE ORAL at 08:54

## 2023-06-20 RX ADMIN — SODIUM CHLORIDE, PRESERVATIVE FREE 10 ML: 5 INJECTION INTRAVENOUS at 08:54

## 2023-06-20 RX ADMIN — CLOPIDOGREL BISULFATE 75 MG: 75 TABLET ORAL at 08:54

## 2023-06-20 RX ADMIN — FERROUS SULFATE TAB 325 MG (65 MG ELEMENTAL FE) 325 MG: 325 (65 FE) TAB at 08:54

## 2023-06-20 NOTE — DISCHARGE SUMMARY
Name:  Romana Avery  Room:  RAMIREZ/NONE  MRN:    [id number]    [facility] Discharge Summary      This discharge summary is in conjunction with a complete physical exam done on the day of discharge. Discharging Provider: Watson Eli PA-C      Admit: 6/19/2023  Discharge:  6/20/2023    HPI taken from admission H&P:    Jerry Graves is a 59 y.o. male with past medical history of coronary artery disease w history of PRAVEENA x2, tobacco abuse, GERD who presents with Chest pain. Patient reports that on Saturday he had an episode of shortness of breath which spontaneously resolved. Yesterday he started having similar presentation of shortness of breath and right-sided chest pain which he describes that intermittently would travel to the left. There is some component of aggravation with change in posture. Alleviated by sitting upright. No new rashes. Denies LOC, dizziness, lightheadedness. Patient states that he was unaware that he has to take his medications for a long time. Stopped taking aspirin/Plavix and statin a year ago. Patient was admitted by my associate earlier today. On my evaluation, hemodynamically stable.      Diagnoses this Admission and Hospital Course   Atypical chest pain  Hx of CAD s/p CSI/shockwave and PRAVEENA x2 LAD/Diag in the setting of NSTEMI 12/23/2021  - echo 12/23/21 EF 55%  - cardiac cath 12/ 23/21 consider staged PCI of circumflex pending outpt clinical follow up, LCX proximal 20-30% stenosis, mid 70-75% stenosis  - Continued aspirin and Lipitor  - EKG without any ischemic changes, sinus bradycardia  - Trend troponins: Negative x2, most recent/3rd trop was elevated at 23   - Stress test pending  - PRN symptom control  - cards cs, recommend he can follow up outpt and resume his aspirin, statin, and BB, no need for plavix at this time     Flank pain  - UA with trace blood  - CT abd/pelvis negative for nephrolithiasis     HTN  - continued lopressor  - monitored BP     GERD  - continued PPI

## 2023-06-20 NOTE — H&P
V2.0  History and Physical      Name:  Viola Hensley /Age/Sex: 1960  (59 y.o. male)   MRN & CSN:  [id number] & [id number] Encounter Date/Time: 2023 9:31 PM EDT   Location:   PCP: None None       Assessment and Plan:   Viola Hensley is a 59 y.o. male with past medical history of coronary artery disease w history of PRAVEENA x2, tobacco abuse, GERD who presents with Chest pain    Hospital Problems             Last Modified POA    * (Principal) Chest pain 2023 Yes     Atypical chest pain  Coronary artery disease with history of PRAVEENA x2 ()  EKG with sinus bradycardia, 58/min, no acute ST-T wave abnormalities. Troponin negative x2  Chest x-ray without any acute abnormalities  Continue aspirin Plavix and statin  Continue Lopressor  Stress test ordered by my colleague earlier. GERD  Continue Protonix    Tobacco abuse  Nicotine replacement  Counseled on cessation    Medication nonadherence  Counseled regarding compliance    Observation with telemetry  Full code    Disposition:   Current Living situation: Home  Expected Disposition: Home  Estimated D/C: 1-2 days    Diet ADULT DIET; Regular; No Caffeine  Diet NPO   DVT Prophylaxis [x] Lovenox, []  Heparin, [] SCDs, [] Ambulation,  [] Eliquis, [] Xarelto, [] Coumadin   Code Status Full Code   Surrogate Decision Maker/ POA Sister     Personally reviewed Lab Studies and Imaging. History from:     patient    History of Present Illness:     Chief Complaint: Chest pain    Viola Hensley is a 59 y.o. male with past medical history of coronary artery disease w history of PRAVEENA x2, tobacco abuse, GERD who presents with Chest pain. Patient reports that on Saturday he had an episode of shortness of breath which spontaneously resolved. Yesterday he started having similar presentation of shortness of breath and right-sided chest pain which he describes that intermittently would travel to the left.   There is some component of aggravation with change in

## 2023-06-20 NOTE — PROGRESS NOTES
Progress Note    Admit Date:  6/19/2023    Patient was admitted for chest pain and hx of cardiac stents. Subjective:  Mr. Oz Ayala was seen in bed resting. He states that he is not currently having any SOB or nausea. He denies any chest pain but states he is having \"twinges\" in his right flank. He wants to leave AMA, but has agreed to stay for a cardiology consult. Objective:   Patient Vitals for the past 4 hrs:   BP Pulse Resp SpO2   06/20/23 0850 101/66 -- -- --   06/20/23 0700 101/68 59 15 93 %   06/20/23 0600 106/73 67 24 95 %        No intake or output data in the 24 hours ending 06/20/23 0916    Physical Exam:  Gen: No distress. Alert. Eyes: PERRL. No sclera icterus. No conjunctival injection. ENT: No discharge. Pharynx clear. Neck: No JVD. Trachea midline. Resp: No accessory muscle use. No crackles. No wheezes. No rhonchi. CV: Bradycardic. Regular rhythm. No murmur. No rub. No edema. GI: Non-tender. Non-distended. Normal bowel sounds. Skin: Warm and dry. No nodule on exposed extremities. M/S: No cyanosis. No joint deformity. No clubbing. Neuro: Awake. Grossly nonfocal    Psych: Oriented x 3. +slight agitation.        Medications:  atorvastatin, 80 mg, Nightly  clopidogrel, 75 mg, Daily  metoprolol tartrate, 12.5 mg, BID  nicotine, 1 patch, Daily  pantoprazole, 40 mg, QAM AC  sodium chloride flush, 5-40 mL, 2 times per day  aspirin, 81 mg, Daily  enoxaparin, 40 mg, Nightly  ferrous sulfate, 325 mg, BID WC      PRN Medications:  sodium chloride flush, 5-40 mL, PRN  sodium chloride, , PRN  ondansetron, 4 mg, Q8H PRN   Or  ondansetron, 4 mg, Q6H PRN  acetaminophen, 650 mg, Q6H PRN   Or  acetaminophen, 650 mg, Q6H PRN  polyethylene glycol, 17 g, Daily PRN  potassium chloride, 40 mEq, PRN   Or  potassium alternative oral replacement, 40 mEq, PRN   Or  potassium chloride, 10 mEq, PRN  magnesium sulfate, 2,000 mg, PRN  nitroGLYCERIN, 0.4 mg, Q5 Min PRN  HYDROcodone 5 mg - acetaminophen, 1

## 2023-06-20 NOTE — ED NOTES
Pt to ED from home with reports of asthma attack vs panic attack tonight.      Pt states she feels better upon arrival to ED but appears anxious and short of breath.  BS CTA in triage.   Report given to AMADO Stallworth.       Grazyna Bynum RN  06/20/23 36 Nayeli Alas RN  06/20/23 2482

## 2023-06-20 NOTE — CONSULTS
[facility]  [phone]        Reason for Consultation/Chief Complaint: \"I have been having rt side pain in lower lateral chest and rt flank. .\"    History of Present Illness:  Ines Hernandez is a 59 y.o. patient who presented to the hospital with complaints of pain in rt lower lateral chest and rt flank off and on since 6.18.23. Pain was lasting few seconds and got worst yesterday. It worsened with deep breath. He has no cough but was short of breath  no fever chills. Denies any GI symptoms  No blood in urine. H/o Heart disease MI and coronary stents in Jan 2021 per patient. He has stopped taking his meds one yr ago and quit following up in office. At the time of his MI he was having pain across his ant chest unlike his pain now  He quit smoking 6 months ago. I have been asked to provide consultation regarding further management and testing. Past Medical History:   has a past medical history of Reflux. Surgical History:   has a past surgical history that includes Tonsillectomy; Appendectomy; hernia repair (Right, 12/16/14); and Cardiac surgery. Social History:   reports that he quit smoking about 17 months ago. His smoking use included cigarettes. He has never used smokeless tobacco. He reports current drug use. Drug: Marijuana Loann Menon). He reports that he does not drink alcohol. Family History:  No premature heart disease  family history is not on file. Home Medications:  Were reviewed and are listed in nursing record. and/or listed below  Prior to Admission medications    Medication Sig Start Date End Date Taking?  Authorizing Provider   clopidogrel (PLAVIX) 75 MG tablet Take 1 tablet by mouth daily  Patient not taking: Reported on 6/8/2023 1/24/22   ALLEN Chance - CNP   ferrous sulfate dried (SLOW IRON) 160 (50 Fe) MG TBCR extended release tablet Take 160 mg by mouth 2 times daily  Patient not taking: Reported on 6/8/2023 1/17/22   ALLEN Del Real -

## 2023-06-20 NOTE — PROGRESS NOTES
Patient notified of lab results and cardiology consult. Patient states his ride is on way and he is leaving AMA d/t \"losing his job. \"  Patient encouraged to stay for cardiology consult. Patient declines at this time stating he cannot stay any longer. Vitals stable at this time.

## 2023-06-20 NOTE — PROGRESS NOTES
Patient states \" ride is here and he is leaving. \" MD notified. AMA paperwork signed. Vitals stable. IV dc'd.

## 2023-06-23 NOTE — ED PROVIDER NOTES
I independently examined and evaluated Ladena Moritz. In brief, patient is a 59-year-old male who presents to the emergency department for evaluation of chest pain. Focused exam revealed nondiaphoretic male, normotensive and satting 98% on room air. He has a heart score of 4. Hospitalist consulted for admission for further evaluation and treatment. Admit. All diagnostic, treatment, and disposition decisions were made by myself in conjunction with the advanced practice provider/resident physician. I personally saw the patient and performed a substantive portion of the visit including aspects of the medical decision making. Comment: Please note this report has been produced using speech recognition software and may contain errors related to that system including errors in grammar, punctuation, and spelling, as well as words and phrases that may be inappropriate. If there are any questions or concerns please feel free to contact the dictating provider for clarification. For all further details of the patient's emergency department visit, please see the advanced practice provider's documentation.         Henery Holstein, MD  06/23/23 0284
sepsis or septic shock? No   Exclusion criteria - the patient is NOT to be included for SEP-1 Core Measure due to: Infection is not suspected    Discharge Time out:  CC Reviewed Yes   Test Results Yes     Vitals:    06/20/23 0850   BP: 101/66   Pulse:    Resp:    Temp:    SpO2:               FINAL IMPRESSION      1.  Chest pain, unspecified type          DISPOSITION/PLAN   DISPOSITION Admitted 06/19/2023 06:33:04 PM      PATIENT REFERREDTO:  ALLEN Forman CNP  [address]  [phone]    Go on 7/19/2023  hospital follow up, [address] at 10:00      DISCHARGE MEDICATIONS:  New Prescriptions    No medications on file       DISCONTINUED MEDICATIONS:  Discontinued Medications    No medications on file              (Please note that portions ofthis note were completed with a voice recognition program.  Efforts were made to edit the dictations but occasionally words are mis-transcribed.)    ALLEN Taylor CNP (electronically signed)       ALLEN Taylor CNP  06/19/23 [address], APRN - CNP  06/20/23 5365

## 2023-07-03 NOTE — PROGRESS NOTES

## 2023-07-06 ENCOUNTER — ANESTHESIA EVENT (OUTPATIENT)
Dept: OPERATING ROOM | Age: 63
End: 2023-07-06
Payer: COMMERCIAL

## 2023-07-07 ENCOUNTER — HOSPITAL ENCOUNTER (OUTPATIENT)
Age: 63
Setting detail: OUTPATIENT SURGERY
Discharge: HOME | End: 2023-07-07
Attending: SURGERY | Admitting: SURGERY
Payer: COMMERCIAL

## 2023-07-07 ENCOUNTER — ANESTHESIA (OUTPATIENT)
Dept: OPERATING ROOM | Age: 63
End: 2023-07-07
Payer: COMMERCIAL

## 2023-07-07 VITALS
SYSTOLIC BLOOD PRESSURE: 107 MMHG | OXYGEN SATURATION: 93 % | HEIGHT: 69 IN | RESPIRATION RATE: 18 BRPM | TEMPERATURE: 98.6 F | DIASTOLIC BLOOD PRESSURE: 67 MMHG | WEIGHT: 197 LBS | HEART RATE: 60 BPM | BODY MASS INDEX: 29.18 KG/M2

## 2023-07-07 DIAGNOSIS — K40.91 RECURRENT RIGHT INGUINAL HERNIA: Primary | ICD-10-CM

## 2023-07-07 DIAGNOSIS — K40.90 LEFT INGUINAL HERNIA: ICD-10-CM

## 2023-07-07 PROCEDURE — 7100000011 HC PHASE II RECOVERY - ADDTL 15 MIN: Performed by: SURGERY

## 2023-07-07 PROCEDURE — 3600000019 HC SURGERY ROBOT ADDTL 15MIN: Performed by: SURGERY

## 2023-07-07 PROCEDURE — 6360000002 HC RX W HCPCS: Performed by: SURGERY

## 2023-07-07 PROCEDURE — C1781 MESH (IMPLANTABLE): HCPCS | Performed by: SURGERY

## 2023-07-07 PROCEDURE — 2580000003 HC RX 258: Performed by: NURSE ANESTHETIST, CERTIFIED REGISTERED

## 2023-07-07 PROCEDURE — 2709999900 HC NON-CHARGEABLE SUPPLY: Performed by: SURGERY

## 2023-07-07 PROCEDURE — 3700000000 HC ANESTHESIA ATTENDED CARE: Performed by: SURGERY

## 2023-07-07 PROCEDURE — 2500000003 HC RX 250 WO HCPCS: Performed by: NURSE ANESTHETIST, CERTIFIED REGISTERED

## 2023-07-07 PROCEDURE — 3700000001 HC ADD 15 MINUTES (ANESTHESIA): Performed by: SURGERY

## 2023-07-07 PROCEDURE — 7100000000 HC PACU RECOVERY - FIRST 15 MIN: Performed by: SURGERY

## 2023-07-07 PROCEDURE — 2580000003 HC RX 258: Performed by: SURGERY

## 2023-07-07 PROCEDURE — 6370000000 HC RX 637 (ALT 250 FOR IP): Performed by: ANESTHESIOLOGY

## 2023-07-07 PROCEDURE — 3600000009 HC SURGERY ROBOT BASE: Performed by: SURGERY

## 2023-07-07 PROCEDURE — S2900 ROBOTIC SURGICAL SYSTEM: HCPCS | Performed by: SURGERY

## 2023-07-07 PROCEDURE — 49650 LAP ING HERNIA REPAIR INIT: CPT | Performed by: SURGERY

## 2023-07-07 PROCEDURE — 49651 LAP ING HERNIA REPAIR RECUR: CPT | Performed by: SURGERY

## 2023-07-07 PROCEDURE — 7100000010 HC PHASE II RECOVERY - FIRST 15 MIN: Performed by: SURGERY

## 2023-07-07 PROCEDURE — 6360000002 HC RX W HCPCS: Performed by: NURSE ANESTHETIST, CERTIFIED REGISTERED

## 2023-07-07 PROCEDURE — 2580000003 HC RX 258: Performed by: ANESTHESIOLOGY

## 2023-07-07 PROCEDURE — A4217 STERILE WATER/SALINE, 500 ML: HCPCS | Performed by: SURGERY

## 2023-07-07 PROCEDURE — 7100000001 HC PACU RECOVERY - ADDTL 15 MIN: Performed by: SURGERY

## 2023-07-07 DEVICE — MESH HERN L W10.8XL16CM R INGUINAL WHT POLYPR MFIL: Type: IMPLANTABLE DEVICE | Site: ABDOMEN | Status: FUNCTIONAL

## 2023-07-07 DEVICE — MESH HERN L W10.8XL16CM L INGUINAL WHT POLYPR MFIL: Type: IMPLANTABLE DEVICE | Site: ABDOMEN | Status: FUNCTIONAL

## 2023-07-07 RX ORDER — PROPOFOL 10 MG/ML
INJECTION, EMULSION INTRAVENOUS PRN
Status: DISCONTINUED | OUTPATIENT
Start: 2023-07-07 | End: 2023-07-07 | Stop reason: SDUPTHER

## 2023-07-07 RX ORDER — KETOROLAC TROMETHAMINE 30 MG/ML
INJECTION, SOLUTION INTRAMUSCULAR; INTRAVENOUS PRN
Status: DISCONTINUED | OUTPATIENT
Start: 2023-07-07 | End: 2023-07-07 | Stop reason: SDUPTHER

## 2023-07-07 RX ORDER — LIDOCAINE HYDROCHLORIDE 20 MG/ML
INJECTION, SOLUTION INFILTRATION; PERINEURAL PRN
Status: DISCONTINUED | OUTPATIENT
Start: 2023-07-07 | End: 2023-07-07 | Stop reason: SDUPTHER

## 2023-07-07 RX ORDER — SODIUM CHLORIDE 0.9 % (FLUSH) 0.9 %
5-40 SYRINGE (ML) INJECTION EVERY 12 HOURS SCHEDULED
Status: DISCONTINUED | OUTPATIENT
Start: 2023-07-07 | End: 2023-07-07 | Stop reason: SDUPTHER

## 2023-07-07 RX ORDER — OXYCODONE HYDROCHLORIDE 5 MG/1
10 TABLET ORAL PRN
Status: COMPLETED | OUTPATIENT
Start: 2023-07-07 | End: 2023-07-07

## 2023-07-07 RX ORDER — SODIUM CHLORIDE 0.9 % (FLUSH) 0.9 %
5-40 SYRINGE (ML) INJECTION PRN
Status: DISCONTINUED | OUTPATIENT
Start: 2023-07-07 | End: 2023-07-07 | Stop reason: HOSPADM

## 2023-07-07 RX ORDER — OXYCODONE HYDROCHLORIDE 5 MG/1
5 TABLET ORAL PRN
Status: COMPLETED | OUTPATIENT
Start: 2023-07-07 | End: 2023-07-07

## 2023-07-07 RX ORDER — SODIUM CHLORIDE 9 MG/ML
INJECTION, SOLUTION INTRAVENOUS PRN
Status: DISCONTINUED | OUTPATIENT
Start: 2023-07-07 | End: 2023-07-07 | Stop reason: SDUPTHER

## 2023-07-07 RX ORDER — SODIUM CHLORIDE, SODIUM LACTATE, POTASSIUM CHLORIDE, CALCIUM CHLORIDE 600; 310; 30; 20 MG/100ML; MG/100ML; MG/100ML; MG/100ML
INJECTION, SOLUTION INTRAVENOUS CONTINUOUS PRN
Status: DISCONTINUED | OUTPATIENT
Start: 2023-07-07 | End: 2023-07-07 | Stop reason: SDUPTHER

## 2023-07-07 RX ORDER — HEPARIN SODIUM 5000 [USP'U]/ML
5000 INJECTION, SOLUTION INTRAVENOUS; SUBCUTANEOUS ONCE
Status: COMPLETED | OUTPATIENT
Start: 2023-07-07 | End: 2023-07-07

## 2023-07-07 RX ORDER — FENTANYL CITRATE 50 UG/ML
INJECTION, SOLUTION INTRAMUSCULAR; INTRAVENOUS PRN
Status: DISCONTINUED | OUTPATIENT
Start: 2023-07-07 | End: 2023-07-07 | Stop reason: SDUPTHER

## 2023-07-07 RX ORDER — SODIUM CHLORIDE 9 MG/ML
INJECTION, SOLUTION INTRAVENOUS PRN
Status: DISCONTINUED | OUTPATIENT
Start: 2023-07-07 | End: 2023-07-07 | Stop reason: HOSPADM

## 2023-07-07 RX ORDER — SODIUM CHLORIDE 0.9 % (FLUSH) 0.9 %
5-40 SYRINGE (ML) INJECTION EVERY 12 HOURS SCHEDULED
Status: DISCONTINUED | OUTPATIENT
Start: 2023-07-07 | End: 2023-07-07 | Stop reason: HOSPADM

## 2023-07-07 RX ORDER — MAGNESIUM HYDROXIDE 1200 MG/15ML
LIQUID ORAL CONTINUOUS PRN
Status: COMPLETED | OUTPATIENT
Start: 2023-07-07 | End: 2023-07-07

## 2023-07-07 RX ORDER — MEPERIDINE HYDROCHLORIDE 25 MG/ML
12.5 INJECTION INTRAMUSCULAR; INTRAVENOUS; SUBCUTANEOUS EVERY 5 MIN PRN
Status: DISCONTINUED | OUTPATIENT
Start: 2023-07-07 | End: 2023-07-07 | Stop reason: HOSPADM

## 2023-07-07 RX ORDER — LIDOCAINE HYDROCHLORIDE 10 MG/ML
1 INJECTION, SOLUTION EPIDURAL; INFILTRATION; INTRACAUDAL; PERINEURAL
Status: DISCONTINUED | OUTPATIENT
Start: 2023-07-07 | End: 2023-07-07 | Stop reason: HOSPADM

## 2023-07-07 RX ORDER — ONDANSETRON 2 MG/ML
4 INJECTION INTRAMUSCULAR; INTRAVENOUS
Status: DISCONTINUED | OUTPATIENT
Start: 2023-07-07 | End: 2023-07-07 | Stop reason: HOSPADM

## 2023-07-07 RX ORDER — BUPIVACAINE HYDROCHLORIDE 5 MG/ML
INJECTION, SOLUTION EPIDURAL; INTRACAUDAL PRN
Status: DISCONTINUED | OUTPATIENT
Start: 2023-07-07 | End: 2023-07-07 | Stop reason: ALTCHOICE

## 2023-07-07 RX ORDER — MIDAZOLAM HYDROCHLORIDE 1 MG/ML
2 INJECTION INTRAMUSCULAR; INTRAVENOUS
Status: DISCONTINUED | OUTPATIENT
Start: 2023-07-07 | End: 2023-07-07 | Stop reason: HOSPADM

## 2023-07-07 RX ORDER — DEXMEDETOMIDINE HYDROCHLORIDE 100 UG/ML
INJECTION, SOLUTION INTRAVENOUS PRN
Status: DISCONTINUED | OUTPATIENT
Start: 2023-07-07 | End: 2023-07-07 | Stop reason: SDUPTHER

## 2023-07-07 RX ORDER — DIPHENHYDRAMINE HYDROCHLORIDE 50 MG/ML
12.5 INJECTION INTRAMUSCULAR; INTRAVENOUS
Status: DISCONTINUED | OUTPATIENT
Start: 2023-07-07 | End: 2023-07-07 | Stop reason: HOSPADM

## 2023-07-07 RX ORDER — ONDANSETRON 2 MG/ML
INJECTION INTRAMUSCULAR; INTRAVENOUS PRN
Status: DISCONTINUED | OUTPATIENT
Start: 2023-07-07 | End: 2023-07-07 | Stop reason: SDUPTHER

## 2023-07-07 RX ORDER — SODIUM CHLORIDE, SODIUM LACTATE, POTASSIUM CHLORIDE, CALCIUM CHLORIDE 600; 310; 30; 20 MG/100ML; MG/100ML; MG/100ML; MG/100ML
INJECTION, SOLUTION INTRAVENOUS CONTINUOUS
Status: DISCONTINUED | OUTPATIENT
Start: 2023-07-07 | End: 2023-07-07 | Stop reason: HOSPADM

## 2023-07-07 RX ORDER — DEXAMETHASONE SODIUM PHOSPHATE 4 MG/ML
INJECTION, SOLUTION INTRA-ARTICULAR; INTRALESIONAL; INTRAMUSCULAR; INTRAVENOUS; SOFT TISSUE PRN
Status: DISCONTINUED | OUTPATIENT
Start: 2023-07-07 | End: 2023-07-07 | Stop reason: SDUPTHER

## 2023-07-07 RX ORDER — OXYCODONE HYDROCHLORIDE AND ACETAMINOPHEN 5; 325 MG/1; MG/1
1 TABLET ORAL EVERY 6 HOURS PRN
Qty: 20 TABLET | Refills: 0 | Status: SHIPPED | OUTPATIENT
Start: 2023-07-07 | End: 2023-07-12

## 2023-07-07 RX ORDER — SODIUM CHLORIDE 0.9 % (FLUSH) 0.9 %
5-40 SYRINGE (ML) INJECTION PRN
Status: DISCONTINUED | OUTPATIENT
Start: 2023-07-07 | End: 2023-07-07 | Stop reason: SDUPTHER

## 2023-07-07 RX ORDER — OXYCODONE HYDROCHLORIDE AND ACETAMINOPHEN 5; 325 MG/1; MG/1
1 TABLET ORAL EVERY 6 HOURS PRN
Qty: 20 TABLET | Refills: 0 | Status: SHIPPED | OUTPATIENT
Start: 2023-07-07 | End: 2023-07-07 | Stop reason: SDUPTHER

## 2023-07-07 RX ORDER — LABETALOL HYDROCHLORIDE 5 MG/ML
10 INJECTION, SOLUTION INTRAVENOUS
Status: DISCONTINUED | OUTPATIENT
Start: 2023-07-07 | End: 2023-07-07 | Stop reason: HOSPADM

## 2023-07-07 RX ORDER — ROCURONIUM BROMIDE 10 MG/ML
INJECTION, SOLUTION INTRAVENOUS PRN
Status: DISCONTINUED | OUTPATIENT
Start: 2023-07-07 | End: 2023-07-07 | Stop reason: SDUPTHER

## 2023-07-07 RX ADMIN — SUGAMMADEX 200 MG: 100 INJECTION, SOLUTION INTRAVENOUS at 08:48

## 2023-07-07 RX ADMIN — ONDANSETRON HYDROCHLORIDE 4 MG: 2 INJECTION, SOLUTION INTRAMUSCULAR; INTRAVENOUS at 07:47

## 2023-07-07 RX ADMIN — CEFAZOLIN 2000 MG: 2 INJECTION, POWDER, FOR SOLUTION INTRAMUSCULAR; INTRAVENOUS at 07:30

## 2023-07-07 RX ADMIN — HEPARIN SODIUM 5000 UNITS: 5000 INJECTION INTRAVENOUS; SUBCUTANEOUS at 06:51

## 2023-07-07 RX ADMIN — DEXAMETHASONE SODIUM PHOSPHATE 8 MG: 4 INJECTION, SOLUTION INTRAMUSCULAR; INTRAVENOUS at 07:47

## 2023-07-07 RX ADMIN — KETOROLAC TROMETHAMINE 30 MG: 30 INJECTION, SOLUTION INTRAMUSCULAR at 08:48

## 2023-07-07 RX ADMIN — DEXMEDETOMIDINE HYDROCHLORIDE 20 MCG: 100 INJECTION, SOLUTION INTRAVENOUS at 07:37

## 2023-07-07 RX ADMIN — SODIUM CHLORIDE, POTASSIUM CHLORIDE, SODIUM LACTATE AND CALCIUM CHLORIDE: 600; 310; 30; 20 INJECTION, SOLUTION INTRAVENOUS at 06:50

## 2023-07-07 RX ADMIN — OXYCODONE HYDROCHLORIDE 5 MG: 5 TABLET ORAL at 09:45

## 2023-07-07 RX ADMIN — SODIUM CHLORIDE, POTASSIUM CHLORIDE, SODIUM LACTATE AND CALCIUM CHLORIDE: 600; 310; 30; 20 INJECTION, SOLUTION INTRAVENOUS at 07:34

## 2023-07-07 RX ADMIN — ROCURONIUM BROMIDE 20 MG: 10 INJECTION, SOLUTION INTRAVENOUS at 08:21

## 2023-07-07 RX ADMIN — FENTANYL CITRATE 50 MCG: 50 INJECTION INTRAMUSCULAR; INTRAVENOUS at 07:37

## 2023-07-07 RX ADMIN — LIDOCAINE HYDROCHLORIDE 60 MG: 20 INJECTION, SOLUTION INFILTRATION; PERINEURAL at 07:37

## 2023-07-07 RX ADMIN — ROCURONIUM BROMIDE 50 MG: 10 INJECTION, SOLUTION INTRAVENOUS at 07:37

## 2023-07-07 RX ADMIN — FENTANYL CITRATE 50 MCG: 50 INJECTION INTRAMUSCULAR; INTRAVENOUS at 08:32

## 2023-07-07 RX ADMIN — PROPOFOL 200 MG: 10 INJECTION, EMULSION INTRAVENOUS at 07:37

## 2023-07-07 ASSESSMENT — PAIN - FUNCTIONAL ASSESSMENT: PAIN_FUNCTIONAL_ASSESSMENT: 0-10

## 2023-07-07 ASSESSMENT — PAIN DESCRIPTION - ORIENTATION: ORIENTATION: MID;LOWER

## 2023-07-07 ASSESSMENT — PAIN DESCRIPTION - DESCRIPTORS: DESCRIPTORS: ACHING;SORE;TENDER

## 2023-07-07 ASSESSMENT — PAIN DESCRIPTION - LOCATION: LOCATION: ABDOMEN

## 2023-07-07 ASSESSMENT — PAIN SCALES - GENERAL: PAINLEVEL_OUTOF10: 4

## 2023-07-07 NOTE — BRIEF OP NOTE
Brief Postoperative Note      Patient: Santiago Simpson  YOB: 1960  MRN: [id number]    Date of Procedure: 7/7/2023    Pre-Op Diagnosis Codes:     * Unilateral recurrent inguinal hernia without obstruction or gangrene [K40.91]     * Non-recurrent unilateral inguinal hernia without obstruction or gangrene [K40.90]    Post-Op Diagnosis: Same       Procedure(s):  ROBOTIC RECURRENT RIGHT INGUINAL HERNIA, LEFT INGUINAL HERNIA REPAIR    Surgeon(s):  Jaylyn Handley MD    Assistant:  Surgical Assistant: Guille Alejandro    Anesthesia: General    Estimated Blood Loss (mL): Minimal    Complications: None    Specimens:   * No specimens in log *    Implants:  Implant Name Type Inv.  Item Serial No.  Lot No. LRB No. Used Action   MESH FUNMI L [id number] L INGUINAL WHT POLYPR MFIL - [id number]  MESH FUNMI L [id number] L INGUINAL WHT POLYPR MFIL  BARD DAVOL-WD [id number] Left 1 Implanted   MESH FUNMI L [id number] R INGUINAL WHT POLYPR MFIL - [id number]  MESH FUNMI L [id number] R INGUINAL WHT POLYPR MFIL  BARD DAVOL-WD [id number] Right 1 Implanted         Drains: * No LDAs found *    Findings: as above      Electronically signed by Jaylyn Handley MD on 7/7/2023 at 8:57 AM

## 2023-07-07 NOTE — H&P
I have reviewed the progress note serving as history and physical dated June/8/2023 and examined the patient and find no relevant changes. I have reviewed with the patient and/or family the risks, benefits, and alternatives to the procedure.

## 2023-07-07 NOTE — PROGRESS NOTES
Discharge instructions given to patient's sister Alexey Verduzco at this time, she states understanding.

## 2023-07-07 NOTE — ANESTHESIA POSTPROCEDURE EVALUATION
Department of Anesthesiology  Postprocedure Note    Patient: Herman Fleming  MRN: [id number]  [address] [facility] 1960  Date of evaluation: 7/7/2023      Procedure Summary     Date: 07/07/23 Room / Location: [facility]    Anesthesia Start: 2922 Anesthesia Stop: 2384    Procedure: ROBOTIC RECURRENT RIGHT INGUINAL HERNIA, LEFT INGUINAL HERNIA REPAIR (Bilateral: Abdomen) Diagnosis:       Unilateral recurrent inguinal hernia without obstruction or gangrene      Non-recurrent unilateral inguinal hernia without obstruction or gangrene      (Unilateral recurrent inguinal hernia without obstruction or gangrene [K40.91])      (Non-recurrent unilateral inguinal hernia without obstruction or gangrene [K40.90])    Surgeons: Emily Kilgore MD Responsible Provider: Marie Matias MD    Anesthesia Type: general ASA Status: 3          Anesthesia Type: No value filed. Luis Phase I: Luis Score: 10    Luis Phase II: Luis Score: 10      Anesthesia Post Evaluation    Patient location during evaluation: PACU  Patient participation: complete - patient participated  Level of consciousness: awake and alert  Airway patency: patent  Nausea & Vomiting: no nausea and no vomiting  Complications: no  Cardiovascular status: blood pressure returned to baseline  Respiratory status: acceptable  Hydration status: euvolemic  Comments: VSS on transfer to phase 2 recovery. No anesthetic complications.

## 2023-07-07 NOTE — OP NOTE
[address] [facility], [address]                                OPERATIVE REPORT    PATIENT NAME: Jaswinder Jasmine                      :        1960  MED REC NO:   [id number]                          ROOM:  ACCOUNT NO:   [de-identified]                           ADMIT DATE: 2023  PROVIDER:     Mary Anne Turner MD    DATE OF PROCEDURE:  2023    OPERATIVE SUMMARY    PREOPERATIVE DIAGNOSES:  Left inguinal hernia and recurrent right  inguinal hernia. POSTOPERATIVE DIAGNOSES:  Left inguinal hernia and recurrent right  inguinal hernia. PROCEDURE:  Robotic left inguinal hernia repair with mesh and robotic  recurrent right inguinal hernia repair with mesh. ANESTHESIA:  General.    SURGEON:  Mary Anne Fleming. MD Johnny    ESTIMATED BLOOD LOSS:  Less than 50 mL. INDICATIONS:  The patient is a 58-year-old gentleman who presented with  right groin pain and a bulge. He is found to have an inguinal hernia. He also had a hernia on the contralateral side. He is brought in for  repair. OPERATIVE SUMMARY:  After preoperative evaluation, the patient was  brought in the operating suite and placed in a comfortable supine  position on the operating room table. Monitoring equipment was attached  and general anesthesia was induced. His abdomen was sterilely prepped  and draped and a small incision was made at the superior aspect of the  umbilicus. This was dissected down to the fascia, and a suture of 0  Ethibond was placed on either side of the midline. The midline fascia  was opened and the peritoneal cavity was entered directly. A  figure-of-eight suture of 0 Ethibond was placed across the defect for  later closure. A Richie trocar was inserted, and the abdomen was  insufflated to a pressure of 15 mmHg. The remaining ports were placed  under direct internal visualization. He was placed in Trendelenburg and  the robot was docked.

## 2023-07-07 NOTE — DISCHARGE INSTRUCTIONS
[facility] SURGERY [facility] AND [facility]. Cathy Pinzon M.D. [address] [facility] [address] [address]                [address] Tara Lujan M.D. [address] [facility], [address] [facility],[address]         [place], [address] [place] [address] Dr Zina Guo M.D                         [phone]        [facility] BRENDEN Vieyra M.D. [facility]       POST-OPERATIVE INSTRUCTIONS FOR ROBOTIC HERNIA SURGERY    Call the office to schedule your post-operative appointment with your surgeon for two (2) weeks. You will have either white steri-strips and a water occlusive dressing or surgical glue closing your incisions. If you have clear bandages over your incisions, you may remove them in 3-4 days. Leave the steri-stips in place. These will peel away in 7-10 days. You may shower. Wash incisions gently, and pat them dry. Do not rub your incisions. General guidelines for activity:   Avoid strenuous activity or lifting anything heavier than 20 pounds for 6 weeks. It is OK to be up  walking around, and walking up and down stairs. Do what is comfortable: stop and rest when you feel tired. Drink plenty of fluids and stay on a bland diet for 2-3 days after surgery. Do NOT drive while taking your narcotic pain medicine. Watch for signs of infection:  Excessive warmth or bright redness around your incisions  Leakage cloudy fluid from you incisions  Fever over 101.5  During the laparoscopic procedure that you had, gas is pumped into the abdominal cavity. You may feel abdominal, shoulder, or rib pain for a few days due to this gas. You will have pain medicine ordered.  Take as directed    If you experience constipation:  Increase your water intake  Increase your activity, walking is best.  A stool softener or

## 2023-07-17 PROBLEM — K86.9 DISORDER OF PANCREAS: Status: ACTIVE | Noted: 2023-07-17

## 2023-07-18 ENCOUNTER — OFFICE VISIT (OUTPATIENT)
Dept: SURGERY | Age: 63
End: 2023-07-18

## 2023-07-18 VITALS
WEIGHT: 202 LBS | BODY MASS INDEX: 29.92 KG/M2 | HEIGHT: 69 IN | DIASTOLIC BLOOD PRESSURE: 70 MMHG | SYSTOLIC BLOOD PRESSURE: 100 MMHG

## 2023-07-18 DIAGNOSIS — Z98.890 POST-OPERATIVE STATE: Primary | ICD-10-CM

## 2023-07-18 PROCEDURE — 99024 POSTOP FOLLOW-UP VISIT: CPT | Performed by: SURGERY

## 2023-07-18 NOTE — PROGRESS NOTES
[facility]      S:   Patient presents s/p robotic recurrent right inguinal hernia and robotic left inguinal hernia repair. He reports doing well. O:   Comfortable         Incision sites healing well. A:   S/P robotic inguinal hernia repairs    P:   Follow up as needed.

## 2023-07-19 ENCOUNTER — OFFICE VISIT (OUTPATIENT)
Dept: CARDIOLOGY CLINIC | Age: 63
End: 2023-07-19
Payer: COMMERCIAL

## 2023-07-19 VITALS
OXYGEN SATURATION: 97 % | SYSTOLIC BLOOD PRESSURE: 118 MMHG | HEART RATE: 68 BPM | BODY MASS INDEX: 29.89 KG/M2 | DIASTOLIC BLOOD PRESSURE: 78 MMHG | HEIGHT: 69 IN | WEIGHT: 201.8 LBS

## 2023-07-19 DIAGNOSIS — I25.10 CORONARY ARTERY DISEASE INVOLVING NATIVE CORONARY ARTERY OF NATIVE HEART WITHOUT ANGINA PECTORIS: Primary | ICD-10-CM

## 2023-07-19 DIAGNOSIS — E78.2 MIXED HYPERLIPIDEMIA: ICD-10-CM

## 2023-07-19 PROCEDURE — 3074F SYST BP LT 130 MM HG: CPT | Performed by: NURSE PRACTITIONER

## 2023-07-19 PROCEDURE — 3078F DIAST BP <80 MM HG: CPT | Performed by: NURSE PRACTITIONER

## 2023-07-19 PROCEDURE — 99214 OFFICE O/P EST MOD 30 MIN: CPT | Performed by: NURSE PRACTITIONER

## 2023-07-19 RX ORDER — METOPROLOL SUCCINATE 25 MG/1
12.5 TABLET, EXTENDED RELEASE ORAL DAILY
Qty: 45 TABLET | Refills: 3 | Status: SHIPPED | OUTPATIENT
Start: 2023-07-19

## 2023-07-19 RX ORDER — ATORVASTATIN CALCIUM 80 MG/1
80 TABLET, FILM COATED ORAL DAILY
Qty: 90 TABLET | Refills: 3 | Status: SHIPPED | OUTPATIENT
Start: 2023-07-19

## 2023-07-19 RX ORDER — ASPIRIN 81 MG/1
81 TABLET ORAL DAILY
Qty: 90 TABLET | Refills: 3 | Status: SHIPPED | OUTPATIENT
Start: 2023-07-19

## 2023-07-19 ASSESSMENT — ENCOUNTER SYMPTOMS
GASTROINTESTINAL NEGATIVE: 1
RESPIRATORY NEGATIVE: 1

## 2023-07-19 NOTE — PROGRESS NOTES
[facility]   Cardiology Note              Date:  July 19, 2023  Patientname: Governor Cao  YOB: 1960    Primary Care physician: No primary care provider on file. HISTORY OF PRESENT ILLNESS: Governor Cao is a 59 y.o. male with a history of CAD, HLD, tobacco abuse. He presented 12/23/2021 for chest pain and found to have NSTEMI. LHC 12/23/2021 showed significant LAD/Diag treated with CSI/shockwave and PRAVEENA x2. Echo showed EF 55%. He was admitted 6/2023 for right sided chest pain, not felt to be cardiac. HS troponin 20, 18, 23. Medical management recommended. Today he presents for hospital follow up for CAD. He feels fine. Denies chest pain, shortness of breath, palpitations and dizziness. He is active at work and at home. He is not taking any medications, was not aware he needed to remain on medications long term. He prefers to minimize appointments due to cost.     Cardiologist: Dr. Maycol Pritchard while hospitalized 12/2021    Past Medical History:   has a past medical history of Reflux. Past Surgical History:   has a past surgical history that includes Tonsillectomy; Appendectomy; hernia repair (Right, 12/16/2014); Cardiac surgery; other surgical history (07/07/2023); and hernia repair (Bilateral, 7/7/2023). Home Medications:    Prior to Admission medications    Medication Sig Start Date End Date Taking? Authorizing Provider   omeprazole (PRILOSEC) 20 MG capsule Take 1 capsule by mouth daily  1/17/22  Historical Provider, MD     Allergies:  Patient has no known allergies. Social History:   reports that he quit smoking about 18 months ago. His smoking use included cigarettes. He has never used smokeless tobacco. He reports current drug use. Drug: Marijuana Jacquenette Pun). He reports that he does not drink alcohol. Family History: family history is not on file. Review of Systems   Review of Systems   Constitutional: Negative. Respiratory: Negative. Cardiovascular: Negative.

## 2023-07-19 NOTE — PATIENT INSTRUCTIONS
Everything looks great today, good job!   Start aspirin 81 mg daily, lipitor 80 mg daily, metoprolol 12.5 mg daily (1/2 pill)  Stay active along with a healthy diet  In 3 months, Obtain fasting labs, do not eat or drink 8 hours prior, water and black coffee ok  Follow up next year with Dr. Ranjana Tariq

## 2023-10-02 ENCOUNTER — HOSPITAL ENCOUNTER (EMERGENCY)
Age: 63
Discharge: HOME | End: 2023-10-02
Payer: COMMERCIAL

## 2023-10-02 ENCOUNTER — APPOINTMENT (OUTPATIENT)
Dept: GENERAL RADIOLOGY | Age: 63
End: 2023-10-02
Payer: COMMERCIAL

## 2023-10-02 VITALS
SYSTOLIC BLOOD PRESSURE: 116 MMHG | OXYGEN SATURATION: 97 % | DIASTOLIC BLOOD PRESSURE: 74 MMHG | RESPIRATION RATE: 16 BRPM | TEMPERATURE: 98.2 F | HEART RATE: 93 BPM

## 2023-10-02 DIAGNOSIS — M25.561 ACUTE PAIN OF RIGHT KNEE: Primary | ICD-10-CM

## 2023-10-02 DIAGNOSIS — M25.461 KNEE EFFUSION, RIGHT: ICD-10-CM

## 2023-10-02 PROCEDURE — 73560 X-RAY EXAM OF KNEE 1 OR 2: CPT

## 2023-10-02 PROCEDURE — 6370000000 HC RX 637 (ALT 250 FOR IP): Performed by: PHYSICIAN ASSISTANT

## 2023-10-02 PROCEDURE — 99283 EMERGENCY DEPT VISIT LOW MDM: CPT

## 2023-10-02 RX ORDER — NAPROXEN 500 MG/1
500 TABLET ORAL 2 TIMES DAILY
Qty: 20 TABLET | Refills: 0 | Status: SHIPPED | OUTPATIENT
Start: 2023-10-02 | End: 2023-10-12

## 2023-10-02 RX ORDER — NAPROXEN 500 MG/1
500 TABLET ORAL ONCE
Status: COMPLETED | OUTPATIENT
Start: 2023-10-02 | End: 2023-10-02

## 2023-10-02 RX ADMIN — NAPROXEN 500 MG: 500 TABLET ORAL at 20:46

## 2023-10-02 ASSESSMENT — PAIN SCALES - GENERAL
PAINLEVEL_OUTOF10: 3
PAINLEVEL_OUTOF10: 3

## 2023-10-02 ASSESSMENT — PAIN DESCRIPTION - ONSET: ONSET: SUDDEN

## 2023-10-02 ASSESSMENT — PAIN DESCRIPTION - LOCATION: LOCATION: KNEE

## 2023-10-02 ASSESSMENT — PAIN - FUNCTIONAL ASSESSMENT
PAIN_FUNCTIONAL_ASSESSMENT: ACTIVITIES ARE NOT PREVENTED
PAIN_FUNCTIONAL_ASSESSMENT: 0-10

## 2023-10-02 ASSESSMENT — PAIN DESCRIPTION - FREQUENCY: FREQUENCY: CONTINUOUS

## 2023-10-02 ASSESSMENT — PAIN DESCRIPTION - DESCRIPTORS: DESCRIPTORS: DULL

## 2023-10-02 ASSESSMENT — PAIN DESCRIPTION - PAIN TYPE: TYPE: ACUTE PAIN

## 2023-10-02 ASSESSMENT — PAIN DESCRIPTION - ORIENTATION: ORIENTATION: RIGHT

## 2023-10-06 ASSESSMENT — ENCOUNTER SYMPTOMS
EYE DISCHARGE: 0
CONSTIPATION: 0
DIARRHEA: 0
NAUSEA: 0
RHINORRHEA: 0
COUGH: 0
SORE THROAT: 0
SINUS PRESSURE: 0
VOMITING: 0
ABDOMINAL PAIN: 0
SINUS PAIN: 0
CHEST TIGHTNESS: 0
SHORTNESS OF BREATH: 0
EYE REDNESS: 0

## 2023-10-06 NOTE — ED PROVIDER NOTES
Medication List as of 10/2/2023  9:27 PM        STOP taking these medications       omeprazole (PRILOSEC) 20 MG capsule Comments:   Reason for Stopping:                      (Please note that portions of this note were completed with a voice recognition program.  Efforts were made to edit the dictations but occasionally words are mis-transcribed.)    Cedric Brito PA-C (electronically signed)            Cedric Brito PA-C  10/06/23 0056

## 2024-07-19 DIAGNOSIS — Z79.899 MEDICATION MANAGEMENT: Primary | ICD-10-CM

## 2024-07-19 NOTE — TELEPHONE ENCOUNTER
NPLR please advise    Reached out to patient again.  He doesn't want to come in every year to be told to take his medicines. Wants to know why we can't just prescribe meds.  Also wanted a weekend appt because he can't miss work.  He said he is already in trouble at work. Is there anything that we can do for him??            LMOVM for patient to call back and make office visit and labs.          LOV  07/19/2023  UPCOMING  NONE  BMP  06/20/2023  CBC   06/20/2023  LIPID  06/20/2023  LFT    06/19/2023

## 2024-07-22 RX ORDER — ATORVASTATIN CALCIUM 80 MG/1
80 TABLET, FILM COATED ORAL DAILY
Qty: 90 TABLET | Refills: 0 | Status: SHIPPED | OUTPATIENT
Start: 2024-07-22

## 2024-07-22 RX ORDER — ASPIRIN 81 MG/1
81 TABLET ORAL DAILY
Qty: 90 TABLET | Refills: 3 | Status: SHIPPED | OUTPATIENT
Start: 2024-07-22

## 2024-07-22 RX ORDER — METOPROLOL SUCCINATE 25 MG/1
12.5 TABLET, EXTENDED RELEASE ORAL DAILY
Qty: 45 TABLET | Refills: 0 | Status: SHIPPED | OUTPATIENT
Start: 2024-07-22

## 2024-07-22 NOTE — TELEPHONE ENCOUNTER
Unfortunately has to be seen yearly to for symptom update, monitor for drug toxicity and continue refilling medications. If he does not want to follow with cardiology, needs to see a PCP who can then fill medications.

## 2024-08-16 ENCOUNTER — HOSPITAL ENCOUNTER (EMERGENCY)
Age: 64
Discharge: HOME | End: 2024-08-16
Attending: EMERGENCY MEDICINE
Payer: COMMERCIAL

## 2024-08-16 ENCOUNTER — APPOINTMENT (OUTPATIENT)
Dept: GENERAL RADIOLOGY | Age: 64
End: 2024-08-16
Payer: COMMERCIAL

## 2024-08-16 VITALS
RESPIRATION RATE: 18 BRPM | HEIGHT: 69 IN | WEIGHT: 201 LBS | TEMPERATURE: 97 F | SYSTOLIC BLOOD PRESSURE: 118 MMHG | DIASTOLIC BLOOD PRESSURE: 71 MMHG | BODY MASS INDEX: 29.77 KG/M2 | HEART RATE: 51 BPM | OXYGEN SATURATION: 100 %

## 2024-08-16 DIAGNOSIS — M94.0 COSTOCHONDRITIS, ACUTE: Primary | ICD-10-CM

## 2024-08-16 LAB
ALBUMIN SERPL-MCNC: 3.9 G/DL (ref 3.4–5)
ALBUMIN/GLOB SERPL: 1.3 {RATIO} (ref 1.1–2.2)
ALP SERPL-CCNC: 61 U/L (ref 40–129)
ALT SERPL-CCNC: 19 U/L (ref 10–40)
ANION GAP SERPL CALCULATED.3IONS-SCNC: 12 MMOL/L (ref 3–16)
AST SERPL-CCNC: 19 U/L (ref 15–37)
BASOPHILS # BLD: 0.1 K/UL (ref 0–0.2)
BASOPHILS NFR BLD: 0.9 %
BILIRUB SERPL-MCNC: 0.3 MG/DL (ref 0–1)
BUN SERPL-MCNC: 21 MG/DL (ref 7–20)
CALCIUM SERPL-MCNC: 8.7 MG/DL (ref 8.3–10.6)
CHLORIDE SERPL-SCNC: 106 MMOL/L (ref 99–110)
CO2 SERPL-SCNC: 20 MMOL/L (ref 21–32)
CREAT SERPL-MCNC: 0.9 MG/DL (ref 0.8–1.3)
DEPRECATED RDW RBC AUTO: 17.9 % (ref 12.4–15.4)
EKG ATRIAL RATE: 55 BPM
EKG DIAGNOSIS: NORMAL
EKG P AXIS: 67 DEGREES
EKG P-R INTERVAL: 118 MS
EKG Q-T INTERVAL: 494 MS
EKG QRS DURATION: 98 MS
EKG QTC CALCULATION (BAZETT): 472 MS
EKG R AXIS: 53 DEGREES
EKG T AXIS: 46 DEGREES
EKG VENTRICULAR RATE: 55 BPM
EOSINOPHIL # BLD: 0 K/UL (ref 0–0.6)
EOSINOPHIL NFR BLD: 0.5 %
GFR SERPLBLD CREATININE-BSD FMLA CKD-EPI: >90 ML/MIN/{1.73_M2}
GLUCOSE SERPL-MCNC: 113 MG/DL (ref 70–99)
HCT VFR BLD AUTO: 42.8 % (ref 40.5–52.5)
HGB BLD-MCNC: 13.8 G/DL (ref 13.5–17.5)
INR PPP: 0.98 (ref 0.85–1.15)
LYMPHOCYTES # BLD: 1.3 K/UL (ref 1–5.1)
LYMPHOCYTES NFR BLD: 13.8 %
MCH RBC QN AUTO: 28.4 PG (ref 26–34)
MCHC RBC AUTO-ENTMCNC: 32.3 G/DL (ref 31–36)
MCV RBC AUTO: 88.1 FL (ref 80–100)
MONOCYTES # BLD: 0.4 K/UL (ref 0–1.3)
MONOCYTES NFR BLD: 4.1 %
NEUTROPHILS # BLD: 7.4 K/UL (ref 1.7–7.7)
NEUTROPHILS NFR BLD: 80.7 %
PLATELET # BLD AUTO: 211 K/UL (ref 135–450)
PMV BLD AUTO: 10.2 FL (ref 5–10.5)
POTASSIUM SERPL-SCNC: 4.1 MMOL/L (ref 3.5–5.1)
PROT SERPL-MCNC: 6.9 G/DL (ref 6.4–8.2)
PROTHROMBIN TIME: 13.2 SEC (ref 11.9–14.9)
RBC # BLD AUTO: 4.86 M/UL (ref 4.2–5.9)
SODIUM SERPL-SCNC: 138 MMOL/L (ref 136–145)
TROPONIN, HIGH SENSITIVITY: 23 NG/L (ref 0–22)
TROPONIN, HIGH SENSITIVITY: 24 NG/L (ref 0–22)
WBC # BLD AUTO: 9.1 K/UL (ref 4–11)

## 2024-08-16 PROCEDURE — 71045 X-RAY EXAM CHEST 1 VIEW: CPT

## 2024-08-16 PROCEDURE — 85610 PROTHROMBIN TIME: CPT

## 2024-08-16 PROCEDURE — 6370000000 HC RX 637 (ALT 250 FOR IP): Performed by: EMERGENCY MEDICINE

## 2024-08-16 PROCEDURE — 84484 ASSAY OF TROPONIN QUANT: CPT

## 2024-08-16 PROCEDURE — 85025 COMPLETE CBC W/AUTO DIFF WBC: CPT

## 2024-08-16 PROCEDURE — 36415 COLL VENOUS BLD VENIPUNCTURE: CPT

## 2024-08-16 PROCEDURE — 93005 ELECTROCARDIOGRAM TRACING: CPT | Performed by: EMERGENCY MEDICINE

## 2024-08-16 PROCEDURE — 93010 ELECTROCARDIOGRAM REPORT: CPT | Performed by: INTERNAL MEDICINE

## 2024-08-16 PROCEDURE — 80053 COMPREHEN METABOLIC PANEL: CPT

## 2024-08-16 PROCEDURE — 99285 EMERGENCY DEPT VISIT HI MDM: CPT

## 2024-08-16 PROCEDURE — 6360000002 HC RX W HCPCS: Performed by: PHYSICIAN ASSISTANT

## 2024-08-16 PROCEDURE — 96374 THER/PROPH/DIAG INJ IV PUSH: CPT

## 2024-08-16 RX ORDER — DIAZEPAM 5 MG/1
5 TABLET ORAL ONCE
Status: COMPLETED | OUTPATIENT
Start: 2024-08-16 | End: 2024-08-16

## 2024-08-16 RX ORDER — NAPROXEN SODIUM 550 MG/1
550 TABLET ORAL 2 TIMES DAILY WITH MEALS
Qty: 60 TABLET | Refills: 3 | Status: SHIPPED | OUTPATIENT
Start: 2024-08-16

## 2024-08-16 RX ORDER — KETOROLAC TROMETHAMINE 15 MG/ML
15 INJECTION, SOLUTION INTRAMUSCULAR; INTRAVENOUS ONCE
Status: COMPLETED | OUTPATIENT
Start: 2024-08-16 | End: 2024-08-16

## 2024-08-16 RX ORDER — CYCLOBENZAPRINE HCL 10 MG
10 TABLET ORAL 3 TIMES DAILY PRN
Qty: 21 TABLET | Refills: 0 | Status: SHIPPED | OUTPATIENT
Start: 2024-08-16 | End: 2024-08-26

## 2024-08-16 RX ORDER — ORPHENADRINE CITRATE 30 MG/ML
60 INJECTION INTRAMUSCULAR; INTRAVENOUS ONCE
Status: DISCONTINUED | OUTPATIENT
Start: 2024-08-16 | End: 2024-08-16

## 2024-08-16 RX ADMIN — KETOROLAC TROMETHAMINE 15 MG: 15 INJECTION, SOLUTION INTRAMUSCULAR; INTRAVENOUS at 15:39

## 2024-08-16 RX ADMIN — DIAZEPAM 5 MG: 5 TABLET ORAL at 15:54

## 2024-08-16 ASSESSMENT — PAIN DESCRIPTION - LOCATION: LOCATION: RIB CAGE

## 2024-08-16 ASSESSMENT — PAIN SCALES - GENERAL
PAINLEVEL_OUTOF10: 10
PAINLEVEL_OUTOF10: 4
PAINLEVEL_OUTOF10: 5

## 2024-08-16 ASSESSMENT — PAIN - FUNCTIONAL ASSESSMENT: PAIN_FUNCTIONAL_ASSESSMENT: 0-10

## 2024-08-16 ASSESSMENT — PAIN DESCRIPTION - PAIN TYPE: TYPE: ACUTE PAIN

## 2024-08-16 ASSESSMENT — PAIN DESCRIPTION - FREQUENCY: FREQUENCY: CONTINUOUS

## 2024-08-16 ASSESSMENT — PAIN DESCRIPTION - ORIENTATION: ORIENTATION: RIGHT

## 2024-08-16 NOTE — ED PROVIDER NOTES
[facility] ED  EMERGENCY DEPARTMENT ENCOUNTER        Pt Name: Aaron Ng  MRN: [id number]  Birthdate 1960  Date of evaluation: 8/16/2024  Provider: Tova Tyler PA-C  PCP: No primary care provider on file.  Note Started: 3:08 PM EDT 8/16/24       I have seen and evaluated this patient with my supervising physician Noa Red MD.      CHIEF COMPLAINT       Chief Complaint   Patient presents with    Rib Pain (injury)     Pt c/o pain to right side of ribs starting yesterday; 1 nitro given in route        HISTORY OF PRESENT ILLNESS: 1 or more Elements     History from : Patient    Limitations to history : None    Aarno Ng is a 64 y.o. male who presents with right side pain.  He states he has had this pain before it is very sharp.  He states no one is ever been able to figure out what it is.  He denies any chest pain or shortness of breath associated with this.  He states if he leans towards his left he does not have the pain however if he tries to straighten his trunk up towards the right that is what reproduces the pain.  He states it feels like tight muscles.  Patient is a former smoker.  Admits to occasionally smoking marijuana denies any drug or alcohol use.  Patient states he lives alone.  He did have some nausea and vomited once this morning but he states it was more like dry heaves because nothing came out.  He denies abdominal pain.  No headache dizziness or lightheadedness.  Nursing Notes were all reviewed and agreed with or any disagreements were addressed in the HPI.    REVIEW OF SYSTEMS :      Review of Systems    Positives and Pertinent negatives as per HPI.     SURGICAL HISTORY     Past Surgical History:   Procedure Laterality Date    APPENDECTOMY      CARDIAC SURGERY      HERNIA REPAIR Right 12/16/2014    Right Inguinal Hernia Repair    HERNIA REPAIR Bilateral 7/7/2023    ROBOTIC RECURRENT RIGHT INGUINAL HERNIA, LEFT INGUINAL HERNIA REPAIR performed by [facility]  pain or shortness of breath making pneumonia or bronchitis unlikely.  The pain was reproducible with movement and did improve with muscle relaxer and anti-inflammatory.  Will continue with these medications via prescription.  This is all discussed with the patient and he is comfortable and agreeable with this plan.  He is encouraged to return should there be any worsening symptoms or concerns.  Disposition Considerations (include 1 Tests not done, Shared Decision Making, Pt Expectation of Test or Tx.):   Appropriate for outpatient management        I am the Primary Clinician of Record.    FINAL IMPRESSION      1. Costochondritis, acute          DISPOSITION/PLAN     DISPOSITION Decision To Discharge 08/16/2024 05:23:21 PM  Condition at Disposition: Good      PATIENT REFERRED TO:  [facility] ED  [address] [place] [address]  [phone]  Schedule an appointment as soon as possible for a visit   If symptoms worsen    [facility]  [address]  [address]  [facility] [address]  [phone]  Schedule an appointment as soon as possible for a visit         DISCHARGE MEDICATIONS:  New Prescriptions    CYCLOBENZAPRINE (FLEXERIL) 10 MG TABLET    Take 1 tablet by mouth 3 times daily as needed for Muscle spasms    NAPROXEN SODIUM (ANAPROX) 550 MG TABLET    Take 1 tablet by mouth 2 times daily (with meals)       DISCONTINUED MEDICATIONS:  Discontinued Medications    NAPROXEN (NAPROSYN) 500 MG TABLET    Take 1 tablet by mouth 2 times daily for 20 doses              (Please note that portions of this note were completed with a voice recognition program.  Efforts were made to edit the dictations but occasionally words are mis-transcribed.)    Tova Tyler PA-C (electronically signed)          Tova Tyler PA-C  08/16/24 [id number]

## 2024-08-16 NOTE — ED PROVIDER NOTES
Emergency Department Attending Provider Note  Location: [facility]  ED      Aaron Ng was evaluated in the Emergency Department. Although initial history and physical exam information was obtained by Tova Tyler (who also dictated a record of this visit), I personally saw the patient and made/approved the management plan and take responsibility for the patient management.     Patient seen and evaluated.  Relevant records reviewed. Chronic medical conditions reviewed.    HPI: 64-year-old male presents with right sided rib pain.  He denies any known injury.  He states it started yesterday.  He states he has been seen with this before.  He just notices if he tries to stand up straight or move a certain way he gets severe pain.  He states it feels like it is in his muscles.  He denies shortness of breath.  No fever or cough.     Physical Exam: Pain reproduced with certain movements.  No rash on the chest wall, no chest wall tenderness.  No respiratory distress.     MDM: Here the patient's clinical exam is consistent with musculoskeletal pain.  EKG shows sinus rhythm with no ischemic changes.  Troponin is slightly elevated at 23.  Repeat is flat at 24.  His clinical history is not consistent with ACS.  Lab work otherwise reassuring.  Chest x-ray normal other than a chronic hiatal hernia.  He feels better after Toradol and Valium here.  Will recommend supportive care at home with muscle relaxers and NSAIDs.  I do not suspect ACS, aortic dissection or PE.         I independently interpreted the following diagnostic test and studies:    Results for orders placed or performed during the hospital encounter of 08/16/24   CBC with Auto Differential   Result Value Ref Range    WBC 9.1 4.0 - 11.0 K/uL    RBC 4.86 4.20 - 5.90 M/uL    Hemoglobin 13.8 13.5 - 17.5 g/dL    Hematocrit 42.8 40.5 - 52.5 %    MCV 88.1 80.0 - 100.0 fL    MCH 28.4 26.0 - 34.0 pg    MCHC 32.3 31.0 - 36.0 g/dL    RDW 17.9 (H) 12.4  - 15.4 %    Platelets 211 135 - 450 K/uL    MPV 10.2 5.0 - 10.5 fL    Neutrophils % 80.7 %    Lymphocytes % 13.8 %    Monocytes % 4.1 %    Eosinophils % 0.5 %    Basophils % 0.9 %    Neutrophils Absolute 7.4 1.7 - 7.7 K/uL    Lymphocytes Absolute 1.3 1.0 - 5.1 K/uL    Monocytes Absolute 0.4 0.0 - 1.3 K/uL    Eosinophils Absolute 0.0 0.0 - 0.6 K/uL    Basophils Absolute 0.1 0.0 - 0.2 K/uL   Comprehensive Metabolic Panel   Result Value Ref Range    Sodium 138 136 - 145 mmol/L    Potassium 4.1 3.5 - 5.1 mmol/L    Chloride 106 99 - 110 mmol/L    CO2 20 (L) 21 - 32 mmol/L    Anion Gap 12 3 - 16    Glucose 113 (H) 70 - 99 mg/dL    BUN 21 (H) 7 - 20 mg/dL    Creatinine 0.9 0.8 - 1.3 mg/dL    Est, Glom Filt Rate >90 >60    Calcium 8.7 8.3 - 10.6 mg/dL    Total Protein 6.9 6.4 - 8.2 g/dL    Albumin 3.9 3.4 - 5.0 g/dL    Albumin/Globulin Ratio 1.3 1.1 - 2.2    Total Bilirubin 0.3 0.0 - 1.0 mg/dL    Alkaline Phosphatase 61 40 - 129 U/L    ALT 19 10 - 40 U/L    AST 19 15 - 37 U/L   Protime-INR   Result Value Ref Range    Protime 13.2 11.9 - 14.9 sec    INR 0.98 0.85 - 1.15   Troponin   Result Value Ref Range    Troponin, High Sensitivity 23 (H) 0 - 22 ng/L   Troponin   Result Value Ref Range    Troponin, High Sensitivity 24 (H) 0 - 22 ng/L   EKG 12 Lead   Result Value Ref Range    Ventricular Rate 55 BPM    Atrial Rate 55 BPM    P-R Interval 118 ms    QRS Duration 98 ms    Q-T Interval 494 ms    QTc Calculation (Bazett) 472 ms    P Axis 67 degrees    R Axis 53 degrees    T Axis 46 degrees    Diagnosis       Sinus bradycardiaNonspecific ST abnormalityWhen compared with ECG of 20-JUN-2023 10:44,No significant change was foundConfirmed by TORO PIEDRA, BREANNA ([id number]) on 8/16/2024 4:39:54 PM       XR CHEST PORTABLE   Final Result   1. No acute cardiopulmonary process identified.   2. Large hiatal hernia, unchanged.             EKG  The Ekg interpreted by myself  sinus bradycardia, rate=55  with a rate of 55  Axis is   Normal  QTc is

## (undated) DEVICE — SUTURE VCRL + SZ 2-0 L27IN ABSRB WHT SH 1/2 CIR TAPERCUT VCP417H

## (undated) DEVICE — SUTURE ABSORBABLE MONOFILAMENT 2-0 SH 6 IN STRATAFIX SPRL SXPP1B415

## (undated) DEVICE — BLADELESS OBTURATOR: Brand: WECK VISTA

## (undated) DEVICE — SEAL

## (undated) DEVICE — CANNULA SEAL

## (undated) DEVICE — 3M™ TEGADERM™ TRANSPARENT FILM DRESSING FRAME STYLE, 1624W, 2-3/8 IN X 2-3/4 IN (6 CM X 7 CM), 100/CT 4CT/CASE: Brand: 3M™ TEGADERM™

## (undated) DEVICE — SOLUTION IV IRRIG POUR BRL 0.9% SODIUM CHL 2F7124

## (undated) DEVICE — SUTURE STRATAFIX SPRL SZ 2 0 L5IN ABSRB VLT SH L26MM 1 2 CIR SXPD2B414

## (undated) DEVICE — ARM DRAPE

## (undated) DEVICE — SUTURE ETHBND EXCEL SZ 0 L18IN NONABSORBABLE GRN L22MM MO-7 CX41D

## (undated) DEVICE — 3M™ STERI-STRIP™ REINFORCED ADHESIVE SKIN CLOSURES, R1540, 1/8 IN X 3 IN (3 MM X 75 MM), 5 STRIPS/ENVELOPE: Brand: 3M™ STERI-STRIP™

## (undated) DEVICE — 3M™ STERI-STRIP™ COMPOUND BENZOIN TINCTURE 40 BAGS/CARTON 4 CARTONS/CASE C1544: Brand: 3M™ STERI-STRIP™

## (undated) DEVICE — Device

## (undated) DEVICE — REDUCER: Brand: ENDOWRIST

## (undated) DEVICE — TIP COVER ACCESSORY

## (undated) DEVICE — STERILE POLYISOPRENE POWDER-FREE SURGICAL GLOVES: Brand: PROTEXIS

## (undated) DEVICE — SUTURE VCRL SZ 4-0 L18IN ABSRB UD L19MM PS-2 3/8 CIR PRIM J496H

## (undated) DEVICE — GAUZE SPONGES,8 PLY: Brand: CURITY